# Patient Record
Sex: FEMALE | Race: WHITE | NOT HISPANIC OR LATINO | Employment: FULL TIME | URBAN - NONMETROPOLITAN AREA
[De-identification: names, ages, dates, MRNs, and addresses within clinical notes are randomized per-mention and may not be internally consistent; named-entity substitution may affect disease eponyms.]

---
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
## 2021-03-16 ENCOUNTER — TRANSCRIBE ORDERS (OUTPATIENT)
Dept: ADMINISTRATIVE | Facility: HOSPITAL | Age: 21
End: 2021-03-16

## 2021-03-16 ENCOUNTER — HOSPITAL ENCOUNTER (OUTPATIENT)
Dept: ULTRASOUND IMAGING | Facility: HOSPITAL | Age: 21
Discharge: HOME | End: 2021-03-16
Admitting: NURSE PRACTITIONER

## 2021-03-16 DIAGNOSIS — R10.11 RIGHT UPPER QUADRANT ABDOMINAL PAIN: ICD-10-CM

## 2021-03-16 DIAGNOSIS — R10.11 RIGHT UPPER QUADRANT ABDOMINAL PAIN: Primary | ICD-10-CM

## 2021-03-16 PROCEDURE — 76705 ECHO EXAM OF ABDOMEN: CPT

## 2021-09-17 ENCOUNTER — TRANSCRIBE ORDERS (OUTPATIENT)
Dept: ADMINISTRATIVE | Facility: HOSPITAL | Age: 21
End: 2021-09-17

## 2021-09-17 ENCOUNTER — HOSPITAL ENCOUNTER (OUTPATIENT)
Dept: CT IMAGING | Facility: HOSPITAL | Age: 21
Discharge: HOME | End: 2021-09-17
Admitting: NURSE PRACTITIONER

## 2021-09-17 DIAGNOSIS — R30.0 DYSURIA: ICD-10-CM

## 2021-09-17 DIAGNOSIS — R31.9 HEMATURIA SYNDROME: ICD-10-CM

## 2021-09-17 DIAGNOSIS — R31.9 HEMATURIA SYNDROME: Primary | ICD-10-CM

## 2021-09-17 PROCEDURE — 74176 CT ABD & PELVIS W/O CONTRAST: CPT

## 2021-09-20 ENCOUNTER — HOSPITAL ENCOUNTER (EMERGENCY)
Facility: HOSPITAL | Age: 21
Discharge: HOME | End: 2021-09-20
Attending: EMERGENCY MEDICINE | Admitting: EMERGENCY MEDICINE

## 2021-09-20 ENCOUNTER — APPOINTMENT (OUTPATIENT)
Dept: ULTRASOUND IMAGING | Facility: HOSPITAL | Age: 21
End: 2021-09-20

## 2021-09-20 VITALS
OXYGEN SATURATION: 99 % | DIASTOLIC BLOOD PRESSURE: 92 MMHG | WEIGHT: 285.2 LBS | HEIGHT: 70 IN | RESPIRATION RATE: 16 BRPM | TEMPERATURE: 98.7 F | BODY MASS INDEX: 40.83 KG/M2 | SYSTOLIC BLOOD PRESSURE: 145 MMHG | HEART RATE: 87 BPM

## 2021-09-20 DIAGNOSIS — D27.1 TERATOMA OF LEFT OVARY: ICD-10-CM

## 2021-09-20 DIAGNOSIS — R10.32 LEFT LOWER QUADRANT ABDOMINAL PAIN: Primary | ICD-10-CM

## 2021-09-20 LAB
ALBUMIN SERPL-MCNC: 4.1 G/DL (ref 3.5–5.2)
ALBUMIN/GLOB SERPL: 1.3 G/DL
ALP SERPL-CCNC: 67 U/L (ref 39–117)
ALT SERPL W P-5'-P-CCNC: 13 U/L (ref 1–33)
ANION GAP SERPL CALCULATED.3IONS-SCNC: 11.6 MMOL/L (ref 5–15)
AST SERPL-CCNC: 23 U/L (ref 1–32)
BACTERIA UR QL AUTO: ABNORMAL /HPF
BASOPHILS # BLD AUTO: 0.04 10*3/MM3 (ref 0–0.2)
BASOPHILS NFR BLD AUTO: 0.6 % (ref 0–1.5)
BILIRUB SERPL-MCNC: <0.2 MG/DL (ref 0–1.2)
BILIRUB UR QL STRIP: NEGATIVE
BUN SERPL-MCNC: 8 MG/DL (ref 6–20)
BUN/CREAT SERPL: 10.3 (ref 7–25)
CALCIUM SPEC-SCNC: 9.2 MG/DL (ref 8.6–10.5)
CHLORIDE SERPL-SCNC: 103 MMOL/L (ref 98–107)
CLARITY UR: CLEAR
CO2 SERPL-SCNC: 21.4 MMOL/L (ref 22–29)
COLOR UR: YELLOW
CREAT SERPL-MCNC: 0.78 MG/DL (ref 0.57–1)
DEPRECATED RDW RBC AUTO: 42.7 FL (ref 37–54)
EOSINOPHIL # BLD AUTO: 0.1 10*3/MM3 (ref 0–0.4)
EOSINOPHIL NFR BLD AUTO: 1.5 % (ref 0.3–6.2)
ERYTHROCYTE [DISTWIDTH] IN BLOOD BY AUTOMATED COUNT: 14.6 % (ref 12.3–15.4)
GFR SERPL CREATININE-BSD FRML MDRD: 94 ML/MIN/1.73
GLOBULIN UR ELPH-MCNC: 3.2 GM/DL
GLUCOSE SERPL-MCNC: 91 MG/DL (ref 65–99)
GLUCOSE UR STRIP-MCNC: NEGATIVE MG/DL
HCT VFR BLD AUTO: 38.1 % (ref 34–46.6)
HGB BLD-MCNC: 12.2 G/DL (ref 12–15.9)
HGB UR QL STRIP.AUTO: ABNORMAL
HYALINE CASTS UR QL AUTO: ABNORMAL /LPF
IMM GRANULOCYTES # BLD AUTO: 0.02 10*3/MM3 (ref 0–0.05)
IMM GRANULOCYTES NFR BLD AUTO: 0.3 % (ref 0–0.5)
KETONES UR QL STRIP: NEGATIVE
LEUKOCYTE ESTERASE UR QL STRIP.AUTO: NEGATIVE
LIPASE SERPL-CCNC: 51 U/L (ref 13–60)
LYMPHOCYTES # BLD AUTO: 3.42 10*3/MM3 (ref 0.7–3.1)
LYMPHOCYTES NFR BLD AUTO: 50.4 % (ref 19.6–45.3)
MCH RBC QN AUTO: 25.5 PG (ref 26.6–33)
MCHC RBC AUTO-ENTMCNC: 32 G/DL (ref 31.5–35.7)
MCV RBC AUTO: 79.5 FL (ref 79–97)
MONOCYTES # BLD AUTO: 0.45 10*3/MM3 (ref 0.1–0.9)
MONOCYTES NFR BLD AUTO: 6.6 % (ref 5–12)
NEUTROPHILS NFR BLD AUTO: 2.75 10*3/MM3 (ref 1.7–7)
NEUTROPHILS NFR BLD AUTO: 40.6 % (ref 42.7–76)
NITRITE UR QL STRIP: NEGATIVE
NRBC BLD AUTO-RTO: 0 /100 WBC (ref 0–0.2)
PH UR STRIP.AUTO: 6.5 [PH] (ref 5–8)
PLATELET # BLD AUTO: 325 10*3/MM3 (ref 140–450)
PMV BLD AUTO: 11.1 FL (ref 6–12)
POTASSIUM SERPL-SCNC: 4.2 MMOL/L (ref 3.5–5.2)
PROT SERPL-MCNC: 7.3 G/DL (ref 6–8.5)
PROT UR QL STRIP: ABNORMAL
RBC # BLD AUTO: 4.79 10*6/MM3 (ref 3.77–5.28)
RBC # UR: ABNORMAL /HPF
REF LAB TEST METHOD: ABNORMAL
SODIUM SERPL-SCNC: 136 MMOL/L (ref 136–145)
SP GR UR STRIP: 1.02 (ref 1–1.03)
SQUAMOUS #/AREA URNS HPF: ABNORMAL /HPF
UROBILINOGEN UR QL STRIP: ABNORMAL
WBC # BLD AUTO: 6.78 10*3/MM3 (ref 3.4–10.8)
WBC UR QL AUTO: ABNORMAL /HPF

## 2021-09-20 PROCEDURE — 83690 ASSAY OF LIPASE: CPT | Performed by: PHYSICIAN ASSISTANT

## 2021-09-20 PROCEDURE — 85025 COMPLETE CBC W/AUTO DIFF WBC: CPT | Performed by: PHYSICIAN ASSISTANT

## 2021-09-20 PROCEDURE — 81001 URINALYSIS AUTO W/SCOPE: CPT | Performed by: PHYSICIAN ASSISTANT

## 2021-09-20 PROCEDURE — 25010000002 ONDANSETRON PER 1 MG: Performed by: PHYSICIAN ASSISTANT

## 2021-09-20 PROCEDURE — 96374 THER/PROPH/DIAG INJ IV PUSH: CPT

## 2021-09-20 PROCEDURE — 76830 TRANSVAGINAL US NON-OB: CPT

## 2021-09-20 PROCEDURE — 80053 COMPREHEN METABOLIC PANEL: CPT | Performed by: PHYSICIAN ASSISTANT

## 2021-09-20 PROCEDURE — 96375 TX/PRO/DX INJ NEW DRUG ADDON: CPT

## 2021-09-20 PROCEDURE — 25010000002 KETOROLAC TROMETHAMINE PER 15 MG: Performed by: PHYSICIAN ASSISTANT

## 2021-09-20 PROCEDURE — 99283 EMERGENCY DEPT VISIT LOW MDM: CPT

## 2021-09-20 RX ORDER — KETOROLAC TROMETHAMINE 30 MG/ML
15 INJECTION, SOLUTION INTRAMUSCULAR; INTRAVENOUS ONCE
Status: COMPLETED | OUTPATIENT
Start: 2021-09-20 | End: 2021-09-20

## 2021-09-20 RX ORDER — ONDANSETRON 2 MG/ML
4 INJECTION INTRAMUSCULAR; INTRAVENOUS ONCE
Status: COMPLETED | OUTPATIENT
Start: 2021-09-20 | End: 2021-09-20

## 2021-09-20 RX ADMIN — KETOROLAC TROMETHAMINE 15 MG: 30 INJECTION, SOLUTION INTRAMUSCULAR at 22:51

## 2021-09-20 RX ADMIN — SODIUM CHLORIDE 1000 ML: 9 INJECTION, SOLUTION INTRAVENOUS at 22:49

## 2021-09-20 RX ADMIN — ONDANSETRON 4 MG: 2 INJECTION INTRAMUSCULAR; INTRAVENOUS at 22:49

## 2021-09-21 NOTE — ED PROVIDER NOTES
Subjective   History of Present Illness   Patient is a 21-year-old female with history of ovarian torsion status post right oophorectomy presenting to the ER with complaints of left lower quadrant pain that has been severe and constant since last night.  Patient states that she has been having intermittent pain for few days now and her PCP ordered a CT scan which was performed on Friday.  She states that she was told she had a golf ball sized cyst on her left ovary.  She states that she has follow-up with OB/GYN in a week but came to the ER for further evaluation due to persistent pain since last night.  She denies any new sexual partners, vaginal discharge, fever, vomiting, urinary symptoms, and additional symptoms/complaints at this time.    Review of Systems   Gastrointestinal: Positive for abdominal pain (LLQ) and nausea.   Genitourinary:        Ovarian cyst   All other systems reviewed and are negative.      Past Medical History:   Diagnosis Date   • Asthma        No Known Allergies    Past Surgical History:   Procedure Laterality Date   • MOUTH SURGERY     • RIGHT OOPHORECTOMY     • TYMPANOSTOMY TUBE PLACEMENT         Family History   Problem Relation Age of Onset   • Hypertension Maternal Grandmother    • Diabetes Maternal Grandmother    • Coronary artery disease Maternal Grandmother    • Lung cancer Maternal Grandfather    • Hypertension Maternal Grandfather    • Coronary artery disease Other    • Breast cancer Other    • Coronary artery disease Father    • No Known Problems Mother        Social History     Socioeconomic History   • Marital status: Single     Spouse name: Not on file   • Number of children: Not on file   • Years of education: Not on file   • Highest education level: Not on file   Tobacco Use   • Smoking status: Never Smoker   • Smokeless tobacco: Never Used   Substance and Sexual Activity   • Alcohol use: No   • Drug use: No   • Sexual activity: Never     Birth control/protection: None            Objective   Physical Exam  Vitals and nursing note reviewed.   Constitutional:       General: She is not in acute distress.     Appearance: She is not toxic-appearing.   HENT:      Head: Normocephalic and atraumatic.   Eyes:      Extraocular Movements: Extraocular movements intact.   Cardiovascular:      Rate and Rhythm: Tachycardia present.      Heart sounds: Normal heart sounds. No murmur heard.   No friction rub. No gallop.    Pulmonary:      Effort: Pulmonary effort is normal.      Breath sounds: Normal breath sounds.   Abdominal:      General: Abdomen is flat. Bowel sounds are normal.      Palpations: Abdomen is soft.      Tenderness: There is abdominal tenderness in the left lower quadrant. There is no guarding or rebound.   Skin:     General: Skin is warm and dry.   Neurological:      General: No focal deficit present.      Mental Status: She is alert and oriented to person, place, and time.   Psychiatric:         Mood and Affect: Mood normal.         Behavior: Behavior normal.         Procedures           ED Course  ED Course as of Sep 20 2359   Mon Sep 20, 2021   2309 WBC: 6.78 [AP]   2309 RBC: 4.79 [AP]   2309 Hemoglobin: 12.2 [AP]   2309 Hematocrit: 38.1 [AP]   2309 Platelets: 325 [AP]   2309 Color, UA: Yellow [AP]   2309 Appearance, UA: Clear [AP]   2309 pH, UA: 6.5 [AP]   2309 Specific Gravity, UA: 1.019 [AP]   2309 Glucose: Negative [AP]   2309 Ketones, UA: Negative [AP]   2309 Bilirubin, UA: Negative [AP]   2309 Blood, UA(!): Large (3+) [AP]   2309 Protein, UA(!): Trace [AP]   2309 Leukocytes, UA: Negative [AP]   2309 Nitrite, UA: Negative [AP]   2309 RBC, UA(!): 6-12 [AP]   2309 WBC, UA: None Seen [AP]   2309 Bacteria, UA(!): Trace [AP]   2309 Squamous Epithelial Cells, UA: 0-2 [AP]   2359 Narrative & Impression  FINAL REPORT     TECHNIQUE:  Transvaginal sonographic images the pelvis were obtained.     CLINICAL HISTORY:  LT OVARIAN CYST ON CT     COMPARISON:  CT  9/17/2021     FINDINGS:  There is mild thickening of the endometrium measuring 17 mm.  There is a left ovarian echogenic structure with ill-defined  posterior acoustic shadowing compatible with a mature cystic  teratoma, also known as a dermoid cyst.  There is no evidence of  torsion.  The right ovary is not visualized.     IMPRESSION:  Left ovarian mature cystic teratoma.  No evidence of torsion.  Mild thickening of the endometrium, may be secondary to  patient's menstrual cycle.     Authenticated by Facundo Guy MD on 09/20/2021 11:50:11 PM    [AP]      ED Course User Index  [AP] Jocelyn Negrete, LISA                                           [facility]   Patient evaluated in the ER for left lower quadrant pain and concern for ovarian torsion.  Patient had ovarian cyst discovered on CT performed Friday and has had persistent pain since yesterday.  Patient has history of ovarian torsion with removal of right ovary.  Transvaginal ultrasound is negative for torsion, reveals a left ovarian mature cystic teratoma.  Patient has follow-up appointment with OB/GYN next week. She was given Toradol and Zofran in the ER. She was advised to take Tylenol and ibuprofen per directions on the package as needed for pain.  Precautions were given for return to the ER for any new or worsening symptoms.    Final diagnoses:   Left lower quadrant abdominal pain   Teratoma of left ovary       ED Disposition  ED Disposition     ED Disposition Condition Comment    Discharge Stable           Belem West, APRN  [address] [address]  [phone]    Call   As needed    [facility]  [address] [facility] [address] [phone]  Go to   As needed, If symptoms worsen    OBGYN    Go to   for scheduled appointment next week for follow-up of left ovarian teratoma         Medication List      No changes were made to your prescriptions during this visit.          Jocelyn Negrete,  LISA  09/20/21 4017

## 2021-09-27 ENCOUNTER — OFFICE VISIT (OUTPATIENT)
Dept: OBSTETRICS AND GYNECOLOGY | Facility: CLINIC | Age: 21
End: 2021-09-27

## 2021-09-27 VITALS
DIASTOLIC BLOOD PRESSURE: 80 MMHG | HEIGHT: 69 IN | BODY MASS INDEX: 42.06 KG/M2 | WEIGHT: 284 LBS | SYSTOLIC BLOOD PRESSURE: 130 MMHG

## 2021-09-27 DIAGNOSIS — Z30.016 ENCOUNTER FOR INITIAL PRESCRIPTION OF TRANSDERMAL PATCH HORMONAL CONTRACEPTIVE DEVICE: ICD-10-CM

## 2021-09-27 DIAGNOSIS — R10.2 PELVIC PAIN: Primary | ICD-10-CM

## 2021-09-27 DIAGNOSIS — D27.1 DERMOID CYST OF LEFT OVARY: ICD-10-CM

## 2021-09-27 PROCEDURE — 99204 OFFICE O/P NEW MOD 45 MIN: CPT | Performed by: PHYSICIAN ASSISTANT

## 2021-09-27 RX ORDER — NORELGESTROMIN AND ETHINYL ESTRADIOL 150; 35 UG/D; UG/D
1 PATCH TRANSDERMAL WEEKLY
Qty: 3 PATCH | Refills: 12 | Status: SHIPPED | OUTPATIENT
Start: 2021-09-27 | End: 2022-09-27

## 2021-09-27 NOTE — PATIENT INSTRUCTIONS
Patient may start birth control patch with her current cycle.  She is advised I will consult MD regarding probable dermoid cyst of the left ovary to discuss possible removal of dermoid.  Given previous removal of her right ovary, want to be as conservative as possible

## 2021-09-27 NOTE — PROGRESS NOTES
Subjective   Chief Complaint   Patient presents with   • Follow-up     Left ovarian cyst, patient c/o pain in ovary       Kylah Ruiz is a 21 y.o. year old  presenting to be seen for complaint of heavy menses and pelvic pain.  Also was found to have a left ovarian cyst on recent CT scan and pelvic ultrasound.  Patient had been seen in the emergency department a few days ago for pelvic pain.  She had previously seen her PCP who ordered a CT scan.  CT scan done on 2021 noted a 4.8 cm left adnexal cyst consistent with a dermoid.  Pelvic ultrasound done 2021 noted a left sided mature teratoma though no size was given. No evidence of torsion noted.  Of note the patient had a right oophorectomy at  in 2017 for a large dermoid.  Patient reports that she has monthly menstrual cycles.  She started bleeding 2 days ago and it seems like a regular menstrual cycle.  She uses condoms for birth control but would like to start other birth control as well.  She reports she has been experiencing some left lower pelvic pain off and on for 1 month however it has been more persistent and increasing in intensity over the last 2 weeks.  She states the pain has been going between a 5-7 out of 10.        Past Medical History:   Diagnosis Date   • Asthma         Current Outpatient Medications:   •  norelgestromin-ethinyl estradiol (Xulane) 150-35 MCG/24HR, Place 1 patch on the skin as directed by provider 1 (One) Time Per Week., Disp: 3 patch, Rfl: 12   No Known Allergies   Past Surgical History:   Procedure Laterality Date   • MOUTH SURGERY     • RIGHT OOPHORECTOMY     • TYMPANOSTOMY TUBE PLACEMENT        Social History     Socioeconomic History   • Marital status: Single     Spouse name: Not on file   • Number of children: Not on file   • Years of education: Not on file   • Highest education level: Not on file   Tobacco Use   • Smoking status: Never Smoker   • Smokeless tobacco: Never Used   Vaping Use   •  "Vaping Use: Every day   • Substances: Nicotine, Flavoring   • Devices: Refillable tank   Substance and Sexual Activity   • Alcohol use: No   • Drug use: No   • Sexual activity: Never     Birth control/protection: None      Family History   Problem Relation Age of Onset   • Hypertension Maternal Grandmother    • Diabetes Maternal Grandmother    • Coronary artery disease Maternal Grandmother    • Lung cancer Maternal Grandfather    • Hypertension Maternal Grandfather    • Coronary artery disease Other    • Breast cancer Other    • Coronary artery disease Father    • No Known Problems Mother        Review of Systems   Constitutional: Negative for chills, diaphoresis and fever.   Gastrointestinal: Negative for constipation, diarrhea, nausea and vomiting.   Genitourinary: Positive for pelvic pain. Negative for difficulty urinating and dysuria.           Objective   /80   Ht 175.3 cm (69\")   Wt 129 kg (284 lb)   LMP 08/26/2021 (Exact Date)   Breastfeeding No   BMI 41.94 kg/m²     Physical Exam  Constitutional:       Appearance: Normal appearance. She is well-developed and well-groomed.   Eyes:      General: Lids are normal.      Extraocular Movements: Extraocular movements intact.      Conjunctiva/sclera: Conjunctivae normal.   Abdominal:      Palpations: Abdomen is soft.      Tenderness: There is no abdominal tenderness.   Skin:     General: Skin is warm and dry.      Findings: No bruising or lesion.   Neurological:      Mental Status: She is alert.   Psychiatric:         Attention and Perception: Attention normal.         Mood and Affect: Mood normal.         Speech: Speech normal.         Behavior: Behavior is cooperative.            Result Review :   The following data was reviewed by: Pamela Rush PA-C on 09/27/2021:    Data reviewed: Radiologic studies CT scan, pelvic ultrasound            Assessment and Plan  Diagnoses and all orders for this visit:    1. Pelvic pain (Primary)    2. Dermoid cyst of " left ovary    3. Encounter for initial prescription of transdermal patch hormonal contraceptive device    Other orders  -     norelgestromin-ethinyl estradiol (Xulane) 150-35 MCG/24HR; Place 1 patch on the skin as directed by provider 1 (One) Time Per Week.  Dispense: 3 patch; Refill: 12      Patient Instructions   Patient may start birth control patch with her current cycle.  She is advised I will consult MD regarding probable dermoid cyst of the left ovary to discuss possible removal of dermoid.  Given previous removal of her right ovary, want to be as conservative as possible              This note was electronically signed.    Pamela Rush PA-C   September 27, 2021

## 2021-09-29 ENCOUNTER — TELEPHONE (OUTPATIENT)
Dept: OBSTETRICS AND GYNECOLOGY | Facility: CLINIC | Age: 21
End: 2021-09-29

## 2021-09-30 ENCOUNTER — TELEPHONE (OUTPATIENT)
Dept: OBSTETRICS AND GYNECOLOGY | Facility: CLINIC | Age: 21
End: 2021-09-30

## 2021-09-30 NOTE — TELEPHONE ENCOUNTER
Patient is informed of Dr. Llanos's recommendation and appointment made for visit and TVS in office.    ----- Message from Savannah Llanos MD sent at 9/28/2021  5:19 PM EDT -----  Yes patient needs to be seen first and also schedule her for TVS here.  ----- Message -----  From: Pamela Rush PA-C  Sent: 9/27/2021   4:23 PM EDT  To: MD Dr. Viet Shane can you please review chart.  Patient had right ovary removed in 2017 at  for a large dermoid (records are in media).  She has now been having pain in the left ovary and recent CT scan and ultrasound revealed a 4.8 cm dermoid of the left ovary.  Would you recommend scheduling a laparoscopy with removal of dermoid cyst and hopefully retain the left ovary.  Should she be seen by physician prior to surgery?  Thank you

## 2023-01-23 ENCOUNTER — HOSPITAL ENCOUNTER (EMERGENCY)
Facility: HOSPITAL | Age: 23
Discharge: HOME | End: 2023-01-23
Attending: EMERGENCY MEDICINE | Admitting: EMERGENCY MEDICINE
Payer: MEDICAID

## 2023-01-23 ENCOUNTER — APPOINTMENT (OUTPATIENT)
Dept: ULTRASOUND IMAGING | Facility: HOSPITAL | Age: 23
End: 2023-01-23
Payer: MEDICAID

## 2023-01-23 VITALS
TEMPERATURE: 98.2 F | RESPIRATION RATE: 18 BRPM | WEIGHT: 285 LBS | BODY MASS INDEX: 42.21 KG/M2 | OXYGEN SATURATION: 100 % | HEIGHT: 69 IN | HEART RATE: 84 BPM | SYSTOLIC BLOOD PRESSURE: 142 MMHG | DIASTOLIC BLOOD PRESSURE: 84 MMHG

## 2023-01-23 DIAGNOSIS — R10.2 PELVIC PAIN: ICD-10-CM

## 2023-01-23 DIAGNOSIS — N83.202 LEFT OVARIAN CYST: Primary | ICD-10-CM

## 2023-01-23 LAB
ALBUMIN SERPL-MCNC: 4.1 G/DL (ref 3.5–5.2)
ALBUMIN/GLOB SERPL: 1.2 G/DL
ALP SERPL-CCNC: 104 U/L (ref 39–117)
ALT SERPL W P-5'-P-CCNC: 25 U/L (ref 1–33)
ANION GAP SERPL CALCULATED.3IONS-SCNC: 9.3 MMOL/L (ref 5–15)
AST SERPL-CCNC: 31 U/L (ref 1–32)
B-HCG UR QL: NEGATIVE
BACTERIA UR QL AUTO: ABNORMAL /HPF
BASOPHILS # BLD AUTO: 0.02 10*3/MM3 (ref 0–0.2)
BASOPHILS NFR BLD AUTO: 0.5 % (ref 0–1.5)
BILIRUB SERPL-MCNC: <0.2 MG/DL (ref 0–1.2)
BILIRUB UR QL STRIP: NEGATIVE
BUN SERPL-MCNC: 10 MG/DL (ref 6–20)
BUN/CREAT SERPL: 12.7 (ref 7–25)
CALCIUM SPEC-SCNC: 9.2 MG/DL (ref 8.6–10.5)
CHLORIDE SERPL-SCNC: 102 MMOL/L (ref 98–107)
CLARITY UR: ABNORMAL
CO2 SERPL-SCNC: 22.7 MMOL/L (ref 22–29)
COLOR UR: ABNORMAL
CREAT SERPL-MCNC: 0.79 MG/DL (ref 0.57–1)
DEPRECATED RDW RBC AUTO: 42.5 FL (ref 37–54)
EGFRCR SERPLBLD CKD-EPI 2021: 109.3 ML/MIN/1.73
EOSINOPHIL # BLD AUTO: 0.07 10*3/MM3 (ref 0–0.4)
EOSINOPHIL NFR BLD AUTO: 1.6 % (ref 0.3–6.2)
ERYTHROCYTE [DISTWIDTH] IN BLOOD BY AUTOMATED COUNT: 14.5 % (ref 12.3–15.4)
GLOBULIN UR ELPH-MCNC: 3.4 GM/DL
GLUCOSE SERPL-MCNC: 92 MG/DL (ref 65–99)
GLUCOSE UR STRIP-MCNC: NEGATIVE MG/DL
HCT VFR BLD AUTO: 39.6 % (ref 34–46.6)
HGB BLD-MCNC: 12.8 G/DL (ref 12–15.9)
HGB UR QL STRIP.AUTO: ABNORMAL
HYALINE CASTS UR QL AUTO: ABNORMAL /LPF
IMM GRANULOCYTES # BLD AUTO: 0.01 10*3/MM3 (ref 0–0.05)
IMM GRANULOCYTES NFR BLD AUTO: 0.2 % (ref 0–0.5)
KETONES UR QL STRIP: NEGATIVE
LEUKOCYTE ESTERASE UR QL STRIP.AUTO: NEGATIVE
LYMPHOCYTES # BLD AUTO: 2.13 10*3/MM3 (ref 0.7–3.1)
LYMPHOCYTES NFR BLD AUTO: 48.1 % (ref 19.6–45.3)
MCH RBC QN AUTO: 26 PG (ref 26.6–33)
MCHC RBC AUTO-ENTMCNC: 32.3 G/DL (ref 31.5–35.7)
MCV RBC AUTO: 80.3 FL (ref 79–97)
MONOCYTES # BLD AUTO: 0.34 10*3/MM3 (ref 0.1–0.9)
MONOCYTES NFR BLD AUTO: 7.7 % (ref 5–12)
NEUTROPHILS NFR BLD AUTO: 1.86 10*3/MM3 (ref 1.7–7)
NEUTROPHILS NFR BLD AUTO: 41.9 % (ref 42.7–76)
NITRITE UR QL STRIP: NEGATIVE
NRBC BLD AUTO-RTO: 0 /100 WBC (ref 0–0.2)
PH UR STRIP.AUTO: 6.5 [PH] (ref 5–8)
PLATELET # BLD AUTO: 343 10*3/MM3 (ref 140–450)
PMV BLD AUTO: 10.9 FL (ref 6–12)
POTASSIUM SERPL-SCNC: 4.3 MMOL/L (ref 3.5–5.2)
PROT SERPL-MCNC: 7.5 G/DL (ref 6–8.5)
PROT UR QL STRIP: ABNORMAL
RBC # BLD AUTO: 4.93 10*6/MM3 (ref 3.77–5.28)
RBC # UR STRIP: ABNORMAL /HPF
REF LAB TEST METHOD: ABNORMAL
SODIUM SERPL-SCNC: 134 MMOL/L (ref 136–145)
SP GR UR STRIP: 1.02 (ref 1–1.03)
SQUAMOUS #/AREA URNS HPF: ABNORMAL /HPF
UROBILINOGEN UR QL STRIP: ABNORMAL
WBC # UR STRIP: ABNORMAL /HPF
WBC NRBC COR # BLD: 4.43 10*3/MM3 (ref 3.4–10.8)

## 2023-01-23 PROCEDURE — 81025 URINE PREGNANCY TEST: CPT | Performed by: PHYSICIAN ASSISTANT

## 2023-01-23 PROCEDURE — 80053 COMPREHEN METABOLIC PANEL: CPT | Performed by: PHYSICIAN ASSISTANT

## 2023-01-23 PROCEDURE — 81001 URINALYSIS AUTO W/SCOPE: CPT | Performed by: PHYSICIAN ASSISTANT

## 2023-01-23 PROCEDURE — 76830 TRANSVAGINAL US NON-OB: CPT

## 2023-01-23 PROCEDURE — 99283 EMERGENCY DEPT VISIT LOW MDM: CPT

## 2023-01-23 PROCEDURE — 96374 THER/PROPH/DIAG INJ IV PUSH: CPT

## 2023-01-23 PROCEDURE — 25010000002 KETOROLAC TROMETHAMINE PER 15 MG: Performed by: PHYSICIAN ASSISTANT

## 2023-01-23 PROCEDURE — 96375 TX/PRO/DX INJ NEW DRUG ADDON: CPT

## 2023-01-23 PROCEDURE — 25010000002 ONDANSETRON PER 1 MG: Performed by: PHYSICIAN ASSISTANT

## 2023-01-23 PROCEDURE — 85025 COMPLETE CBC W/AUTO DIFF WBC: CPT | Performed by: PHYSICIAN ASSISTANT

## 2023-01-23 RX ORDER — ONDANSETRON 4 MG/1
4 TABLET, ORALLY DISINTEGRATING ORAL EVERY 6 HOURS PRN
Qty: 8 TABLET | Refills: 0 | Status: SHIPPED | OUTPATIENT
Start: 2023-01-23 | End: 2023-01-25

## 2023-01-23 RX ORDER — ONDANSETRON 2 MG/ML
4 INJECTION INTRAMUSCULAR; INTRAVENOUS ONCE
Status: COMPLETED | OUTPATIENT
Start: 2023-01-23 | End: 2023-01-23

## 2023-01-23 RX ORDER — KETOROLAC TROMETHAMINE 30 MG/ML
30 INJECTION, SOLUTION INTRAMUSCULAR; INTRAVENOUS ONCE
Status: COMPLETED | OUTPATIENT
Start: 2023-01-23 | End: 2023-01-23

## 2023-01-23 RX ORDER — SODIUM CHLORIDE 0.9 % (FLUSH) 0.9 %
10 SYRINGE (ML) INJECTION AS NEEDED
Status: DISCONTINUED | OUTPATIENT
Start: 2023-01-23 | End: 2023-01-23 | Stop reason: HOSPADM

## 2023-01-23 RX ORDER — KETOROLAC TROMETHAMINE 10 MG/1
10 TABLET, FILM COATED ORAL EVERY 6 HOURS PRN
Qty: 8 TABLET | Refills: 0 | Status: SHIPPED | OUTPATIENT
Start: 2023-01-23 | End: 2023-01-25

## 2023-01-23 RX ADMIN — KETOROLAC TROMETHAMINE 30 MG: 30 INJECTION, SOLUTION INTRAMUSCULAR; INTRAVENOUS at 18:33

## 2023-01-23 RX ADMIN — ONDANSETRON 4 MG: 2 INJECTION INTRAMUSCULAR; INTRAVENOUS at 18:35

## 2023-01-23 NOTE — ED PROVIDER NOTES
Subjective  History of Present Illness:    Chief Complaint: Left-sided pelvic pain  History of Present Illness: Patient is a 22-year-old female with history of asthma and right oophorectomy due to large ovarian cyst presenting to the ER for evaluation of left-sided pelvic pain.  She states she has been having some dull pain in her left lower abdomen/pelvis for approximately 5 days, feels very similar to when she has had ovarian cyst in the past.  She states it seemed to acutely worsen and has become more sharp and stabbing in nature since last night.  Pain does not radiate.  She states she has had some vaginal bleeding with clots today but denies any dizziness or syncope.  She denies any fever, chills, chest pain, cough, shortness of breath, change in bowel movements, dysuria, other vaginal discharge, or any other symptoms.  She denies any concern for STD, has been sexually active.  She states she follows with an OB/GYN at  who has performed surgery on her cysts in the past.  She states that she had an ultrasound scheduled for the 7th of next month but cannot get into see her OB/GYN until March.  She has not had medication for pain prior to arrival.  Onset: 5 days  Duration: 5 days with worsening pain since last night  Exacerbating / Alleviating factors: None  Associated symptoms: Vaginal bleeding      Nurses Notes reviewed and agree, including vitals, allergies, social history and prior medical history.     REVIEW OF SYSTEMS: All systems reviewed and not pertinent unless noted.  Review of Systems      Positive for: Left-sided pelvic pain, vaginal bleeding    Negative for: Fever, chills, nausea, vomiting, dysuria, change in bowel movements, vaginal discharge    Past Medical History:   Diagnosis Date   • Asthma        Allergies:    Patient has no known allergies.      Past Surgical History:   Procedure Laterality Date   • MOUTH SURGERY     • RIGHT OOPHORECTOMY     • TYMPANOSTOMY TUBE PLACEMENT           Social  "History     Socioeconomic History   • Marital status: Single   Tobacco Use   • Smoking status: Never   • Smokeless tobacco: Never   Vaping Use   • Vaping Use: Every day   • Substances: Nicotine, Flavoring   • Devices: Refillable tank   Substance and Sexual Activity   • Alcohol use: No   • Drug use: No   • Sexual activity: Never     Birth control/protection: None         Family History   Problem Relation Age of Onset   • Hypertension Maternal Grandmother    • Diabetes Maternal Grandmother    • Coronary artery disease Maternal Grandmother    • Lung cancer Maternal Grandfather    • Hypertension Maternal Grandfather    • Coronary artery disease Other    • Breast cancer Other    • Coronary artery disease Father    • No Known Problems Mother        Objective  Physical Exam:  /84   Pulse 84   Temp 98.2 °F (36.8 °C) (Oral)   Resp 18   Ht 175.3 cm (69\")   Wt 129 kg (285 lb)   LMP 01/22/2023 (Exact Date)   SpO2 100%   BMI 42.09 kg/m²      Physical Exam    CONSTITUTIONAL: Well developed, nontoxic in appearance, in no acute distress.  She does not appear septic or toxic.  She is answering questions appropriately.  VITAL SIGNS: per nursing, reviewed and noted  SKIN: exposed skin with no rashes, ulcerations or petechiae  EYES: Grossly EOMI, no icterus  ENT: Normal voice.  Patient maintained wearing a mask throughout patient encounter due to coronavirus pandemic  RESPIRATORY: Breathing even and nonlabored, no increased work of breathing. No retractions.  Lung sounds are clear to auscultation bilaterally  CARDIOVASCULAR:  regular rate and rhythm  GI: Large but nondistended.  Patient does have some tenderness in the left lower pelvic region without significant guarding  MUSCULOSKELETAL:  No tenderness. Full ROM. Strength and tone grossly normal.    NEUROLOGIC: Alert, oriented x 3. No gross deficits. GCS 15.   PSYCH: appropriate affect.      Procedures    ED Course:        ED Course as of 01/23/23 1918 Mon Jan 23, " 2023 1745 HCG, Urine QL: Negative [LA]   1745 RBC, UA(!): 31-50 [LA]   1745 WBC, UA(!): 0-2 [LA]   1745 Bacteria, UA: None Seen [LA]   1745 Squamous Epithelial Cells, UA: None Seen [LA]   1745 Hyaline Casts, UA: None Seen [LA]   1745 Methodology:: Manual Light Microscopy [LA]   1745 Color, UA(!): Red [LA]   1745 Appearance, UA(!): Cloudy [LA]   1745 pH, UA: 6.5 [LA]   1745 Specific Gravity, UA: 1.016 [LA]   1745 Glucose: Negative [LA]   1745 Ketones, UA: Negative [LA]   1745 Bilirubin, UA: Negative [LA]   1745 Blood, UA(!): Large (3+) [LA]   1745 Protein, UA(!): Trace [LA]   1745 Leukocytes, UA: Negative [LA]   1745 Nitrite, UA: Negative [LA]   1745 Urobilinogen, UA: 1.0 E.U./dL [LA]   1748 WBC: 4.43 [LA]   1748 Hemoglobin: 12.8 [LA]   1748 Platelets: 343 [LA]   1826 Glucose: 92 [LA]   1827 BUN: 10 [LA]   1827 Creatinine: 0.79 [LA]   1827 Sodium(!): 134 [LA]   1827 Potassium: 4.3 [LA]   1827 Chloride: 102 [LA]   1827 CO2: 22.7 [LA]   1827 Calcium: 9.2 [LA]   1827 Total Protein: 7.5 [LA]   1827 Albumin: 4.1 [LA]   1827 ALT (SGPT): 25 [LA]   1827 AST (SGOT): 31 [LA]   1827 Alkaline Phosphatase: 104 [LA]   1827 Total Bilirubin: <0.2 [LA]   1827 Globulin: 3.4 [LA]   1827 A/G Ratio: 1.2 [LA]   1827 BUN/Creatinine Ratio: 12.7 [LA]   1827 Anion Gap: 9.3 [LA]   1827 eGFR: 109.3 [LA]   1827 Ultrasound was read as abnormal left ovary, small dermoid cyst is not excluded.  Follow-up ultrasound recommended in 6 months, CT could be obtained for further evaluation.  There are measuring a 4 cm cyst on the left ovary with a 1.5 cm echogenic area of uncertain etiology. [LA]   1834 Discussed findings with patient.  Patient's mother is at bedside at this time as well.  She did give me permission to discuss her medical findings in front of her family.  Discussed findings of the ultrasound.  Discussed that we could obtain a CT per radiology but through shared decision making decided to not get this today.  She states the pain is very  similar to her cysts in the past and they were mainly worried about ruling out torsion and they do see appropriate blood flow on ultrasound. [LA]   1906 FINAL REPORT     TECHNIQUE:  Transvaginal sonographic images of the pelvis were obtained.     CLINICAL HISTORY:  History of severe ovarian cysts status post right oophorectomy.  Left lower pe     FINDINGS:  The uterus is normal with a normal endometrial thickness of 4  mm.  The right ovary has been removed.  There is a complex left  ovarian cyst measuring 4 cm in diameter.  Also within the left  ovary is a 1.5 cm echogenic area of uncertain etiology, a  dermoid cyst is not excluded.  The left ovary demonstrates flow  on color Doppler imaging.  There is no free fluid.     IMPRESSION:  Abnormal left ovary, small dermoid cyst is not excluded.  Follow-up ultrasound in 6 months  plus or minus CT is  recommended for further evaluation     Authenticated and Electronically Signed by Sudheer Fernando M.D. on  01/23/2023 07:03:29 PM [LA]   1911 Patient felt better after medication.  Discussed follow-up with primary care provider and OB/GYN.  We will call in a short course of Toradol and Zofran for her to use at home.  Discussed very strict return precautions.  Pelvic exam declined until follow-up with OB/GYN [LA]      ED Course User Index  [LA] Vikki Horton PA-C       Lab Results (last 24 hours)     Procedure Component Value Units Date/Time    Pregnancy, Urine - Urine, Clean Catch [[id number]]  (Normal) Collected: 01/23/23 1640    Specimen: Urine, Clean Catch Updated: 01/23/23 1649     HCG, Urine QL Negative    Urinalysis With Microscopic If Indicated (No Culture) - Urine, Clean Catch [[id number]]  (Abnormal) Collected: 01/23/23 1640    Specimen: Urine, Clean Catch Updated: 01/23/23 1656     Color, UA Red     Appearance, UA Cloudy     pH, UA 6.5     Specific Gravity, UA 1.016     Glucose, UA Negative     Ketones, UA Negative     Bilirubin, UA Negative     Blood, UA Large (3+)      Protein, UA Trace     Leuk Esterase, UA Negative     Nitrite, UA Negative     Urobilinogen, UA 1.0 E.U./dL    Urinalysis, Microscopic Only - Urine, Clean Catch [[id number]]  (Abnormal) Collected: 01/23/23 1640    Specimen: Urine, Clean Catch Updated: 01/23/23 1701     RBC, UA 31-50 /HPF      WBC, UA 0-2 /HPF      Bacteria, UA None Seen /HPF      Squamous Epithelial Cells, UA None Seen /HPF      Hyaline Casts, UA None Seen /LPF      Methodology Manual Light Microscopy    CBC & Differential [[id number]]  (Abnormal) Collected: 01/23/23 1741    Specimen: Blood Updated: 01/23/23 1747    Narrative:      The following orders were created for panel order CBC & Differential.  Procedure                               Abnormality         Status                     ---------                               -----------         ------                     CBC Auto Differential[[id number]]        Abnormal            Final result                 Please view results for these tests on the individual orders.    Comprehensive Metabolic Panel [[id number]]  (Abnormal) Collected: 01/23/23 1741    Specimen: Blood Updated: 01/23/23 1817     Glucose 92 mg/dL      BUN 10 mg/dL      Creatinine 0.79 mg/dL      Sodium 134 mmol/L      Potassium 4.3 mmol/L      Comment: Slight hemolysis detected by analyzer. Results may be affected.        Chloride 102 mmol/L      CO2 22.7 mmol/L      Calcium 9.2 mg/dL      Total Protein 7.5 g/dL      Albumin 4.1 g/dL      ALT (SGPT) 25 U/L      AST (SGOT) 31 U/L      Comment: Slight hemolysis detected by analyzer. Results may be affected.        Alkaline Phosphatase 104 U/L      Total Bilirubin <0.2 mg/dL      Globulin 3.4 gm/dL      A/G Ratio 1.2 g/dL      BUN/Creatinine Ratio 12.7     Anion Gap 9.3 mmol/L      eGFR 109.3 mL/min/1.73     Narrative:      GFR Normal >60  Chronic Kidney Disease <60  Kidney Failure <15      CBC Auto Differential [[id number]]  (Abnormal) Collected: 01/23/23 1741    Specimen: Blood Updated:  01/23/23 1747     WBC 4.43 10*3/mm3      RBC 4.93 10*6/mm3      Hemoglobin 12.8 g/dL      Hematocrit 39.6 %      MCV 80.3 fL      MCH 26.0 pg      MCHC 32.3 g/dL      RDW 14.5 %      RDW-SD 42.5 fl      MPV 10.9 fL      Platelets 343 10*3/mm3      Neutrophil % 41.9 %      Lymphocyte % 48.1 %      Monocyte % 7.7 %      Eosinophil % 1.6 %      Basophil % 0.5 %      Immature Grans % 0.2 %      Neutrophils, Absolute 1.86 10*3/mm3      Lymphocytes, Absolute 2.13 10*3/mm3      Monocytes, Absolute 0.34 10*3/mm3      Eosinophils, Absolute 0.07 10*3/mm3      Basophils, Absolute 0.02 10*3/mm3      Immature Grans, Absolute 0.01 10*3/mm3      nRBC 0.0 /100 WBC            US Non-ob Transvaginal    Result Date: 1/23/2023  FINAL REPORT TECHNIQUE: Transvaginal sonographic images of the pelvis were obtained. CLINICAL HISTORY: History of severe ovarian cysts status post right oophorectomy. Left lower pe FINDINGS: The uterus is normal with a normal endometrial thickness of 4 mm.  The right ovary has been removed.  There is a complex left ovarian cyst measuring 4 cm in diameter.  Also within the left ovary is a 1.5 cm echogenic area of uncertain etiology, a dermoid cyst is not excluded.  The left ovary demonstrates flow on color Doppler imaging.  There is no free fluid.     Impression: Abnormal left ovary, small dermoid cyst is not excluded. Follow-up ultrasound in 6 months  plus or minus CT is recommended for further evaluation Authenticated and Electronically Signed by Sudheer Fernando M.D. on 01/23/2023 07:03:29 PM         MDM    Initial impression of presenting illness: Patient is a 22-year-old female history of severe ovarian cysts status post right oophorectomy presenting to the ER for evaluation of left-sided pelvic pain.  On arrival patient is overall stable, mildly elevated blood pressure, afebrile, no acute distress    DDX: includes but is not limited to: Ovarian cyst, ovarian torsion, PID, cystitis, nephrolithiasis, and other  concerns.    Patient arrives in overall stable condition with vitals interpreted by myself.     Pertinent features from physical exam: Tenderness to the left lower pelvic area, normal vital signs.    Initial diagnostic plan: We will check basic labs, urinalysis and UPT.  Given the fact patient has history of ovarian cysts with worsening pain and only 1 single ovary at this time, will obtain ultrasound to make sure there is no ovarian torsion.    Results from initial plan were reviewed and interpreted by me revealing stable lab work, no significant anemia or leukocytosis.  UPT was negative.  Urine had blood without signs of infection.  Ultrasound was read by the radiologist as a complex 4 cm cyst with normal blood flow.  They state there was an echogenic area in the center that could represent a dermoid cyst.  They recommended repeat ultrasound, said we could obtain a CT scan if warranted.  Discussed with the patient.  Through shared decision-making she declined CT scan since it would not change acute management.  She states she was mostly concerned about the blood flow to the ovary.  She declined pelvic exam until follow-up with OB/GYN    Diagnostic information from other sources: Chart review    Interventions / Re-evaluation: Patient's pain improved after medication.  She was resting comfortably in the stretcher.    Results/clinical rationale were discussed with patient and mother at bedside with patient's permission.  Discussed all findings of the ultrasound, lab work.  We will call her in a short course of Toradol and Zofran to use for pain.  Discussed importance of follow-up with her OB/GYN and contacting their office for earliest available appointment.  Discussed very strict return precautions to the ER.  She verbalized understanding and was in agreement with this plan of care.    Consultations/Discussion of results with other physicians: Dr. Castellon    Disposition plan: Discharge  -----    Final diagnoses:    Left ovarian cyst   Pelvic pain        Vikki Horton PA-C  01/23/23 1918

## 2023-01-24 NOTE — DISCHARGE INSTRUCTIONS
Ultrasound revealed a complex cyst of your left ovary that was measuring 4 cm in diameter.  They saw normal blood flow to the ovary, no free fluid.  Your blood work was stable here today.  There is blood in your urine without signs of infection.  You can take the Toradol as needed for pain.  Do not take additional Aleve, Motrin or ibuprofen with this given it is the same type of medication and very strong.  You can alternate Tylenol 500 mg every 6 hours.  Take Zofran as needed for nausea and vomiting.  Try to call your OB/GYN at  tomorrow to update them on findings and change in your pain.  They may want to see you earlier in their clinic.  Return to the ER for any change, worsening of symptoms, or any additional concerns including but not limited to severe, worsening or persistent pain, intractable vomiting, fever greater than 100.4, heavy bleeding with dizziness or syncope.

## 2023-09-07 ENCOUNTER — HOSPITAL ENCOUNTER (EMERGENCY)
Facility: HOSPITAL | Age: 23
Discharge: HOME | End: 2023-09-07
Attending: STUDENT IN AN ORGANIZED HEALTH CARE EDUCATION/TRAINING PROGRAM
Payer: MEDICAID

## 2023-09-07 ENCOUNTER — APPOINTMENT (OUTPATIENT)
Dept: ULTRASOUND IMAGING | Facility: HOSPITAL | Age: 23
End: 2023-09-07
Payer: MEDICAID

## 2023-09-07 ENCOUNTER — APPOINTMENT (OUTPATIENT)
Dept: CT IMAGING | Facility: HOSPITAL | Age: 23
End: 2023-09-07
Payer: MEDICAID

## 2023-09-07 VITALS
WEIGHT: 293 LBS | DIASTOLIC BLOOD PRESSURE: 59 MMHG | SYSTOLIC BLOOD PRESSURE: 125 MMHG | BODY MASS INDEX: 43.4 KG/M2 | RESPIRATION RATE: 16 BRPM | TEMPERATURE: 97.8 F | HEART RATE: 89 BPM | OXYGEN SATURATION: 96 % | HEIGHT: 69 IN

## 2023-09-07 DIAGNOSIS — N83.202 CYST OF LEFT OVARY: Primary | ICD-10-CM

## 2023-09-07 LAB
ALBUMIN SERPL-MCNC: 4.3 G/DL (ref 3.5–5.2)
ALBUMIN/GLOB SERPL: 1.4 G/DL
ALP SERPL-CCNC: 83 U/L (ref 39–117)
ALT SERPL W P-5'-P-CCNC: 15 U/L (ref 1–33)
ANION GAP SERPL CALCULATED.3IONS-SCNC: 11 MMOL/L (ref 5–15)
AST SERPL-CCNC: 18 U/L (ref 1–32)
B-HCG UR QL: NEGATIVE
BACTERIA UR QL AUTO: ABNORMAL /HPF
BASOPHILS # BLD AUTO: 0.04 10*3/MM3 (ref 0–0.2)
BASOPHILS NFR BLD AUTO: 0.6 % (ref 0–1.5)
BILIRUB SERPL-MCNC: 0.2 MG/DL (ref 0–1.2)
BILIRUB UR QL STRIP: ABNORMAL
BUN SERPL-MCNC: 8 MG/DL (ref 6–20)
BUN/CREAT SERPL: 11 (ref 7–25)
CALCIUM SPEC-SCNC: 9.5 MG/DL (ref 8.6–10.5)
CHLORIDE SERPL-SCNC: 103 MMOL/L (ref 98–107)
CLARITY UR: ABNORMAL
CO2 SERPL-SCNC: 23 MMOL/L (ref 22–29)
COLOR UR: ABNORMAL
CREAT SERPL-MCNC: 0.73 MG/DL (ref 0.57–1)
DEPRECATED RDW RBC AUTO: 41.4 FL (ref 37–54)
EGFRCR SERPLBLD CKD-EPI 2021: 119.4 ML/MIN/1.73
EOSINOPHIL # BLD AUTO: 0.11 10*3/MM3 (ref 0–0.4)
EOSINOPHIL NFR BLD AUTO: 1.5 % (ref 0.3–6.2)
ERYTHROCYTE [DISTWIDTH] IN BLOOD BY AUTOMATED COUNT: 14 % (ref 12.3–15.4)
GLOBULIN UR ELPH-MCNC: 3.1 GM/DL
GLUCOSE SERPL-MCNC: 80 MG/DL (ref 65–99)
GLUCOSE UR STRIP-MCNC: NEGATIVE MG/DL
HCT VFR BLD AUTO: 41.3 % (ref 34–46.6)
HGB BLD-MCNC: 13.7 G/DL (ref 12–15.9)
HGB UR QL STRIP.AUTO: ABNORMAL
HYALINE CASTS UR QL AUTO: ABNORMAL /LPF
IMM GRANULOCYTES # BLD AUTO: 0.02 10*3/MM3 (ref 0–0.05)
IMM GRANULOCYTES NFR BLD AUTO: 0.3 % (ref 0–0.5)
KETONES UR QL STRIP: NEGATIVE
LEUKOCYTE ESTERASE UR QL STRIP.AUTO: ABNORMAL
LYMPHOCYTES # BLD AUTO: 2.93 10*3/MM3 (ref 0.7–3.1)
LYMPHOCYTES NFR BLD AUTO: 40.7 % (ref 19.6–45.3)
MCH RBC QN AUTO: 27.2 PG (ref 26.6–33)
MCHC RBC AUTO-ENTMCNC: 33.2 G/DL (ref 31.5–35.7)
MCV RBC AUTO: 81.9 FL (ref 79–97)
MONOCYTES # BLD AUTO: 0.39 10*3/MM3 (ref 0.1–0.9)
MONOCYTES NFR BLD AUTO: 5.4 % (ref 5–12)
NEUTROPHILS NFR BLD AUTO: 3.71 10*3/MM3 (ref 1.7–7)
NEUTROPHILS NFR BLD AUTO: 51.5 % (ref 42.7–76)
NITRITE UR QL STRIP: NEGATIVE
NRBC BLD AUTO-RTO: 0 /100 WBC (ref 0–0.2)
PH UR STRIP.AUTO: 5.5 [PH] (ref 5–8)
PLATELET # BLD AUTO: 313 10*3/MM3 (ref 140–450)
PMV BLD AUTO: 10.6 FL (ref 6–12)
POTASSIUM SERPL-SCNC: 3.7 MMOL/L (ref 3.5–5.2)
PROT SERPL-MCNC: 7.4 G/DL (ref 6–8.5)
PROT UR QL STRIP: ABNORMAL
RBC # BLD AUTO: 5.04 10*6/MM3 (ref 3.77–5.28)
RBC # UR STRIP: ABNORMAL /HPF
REF LAB TEST METHOD: ABNORMAL
SODIUM SERPL-SCNC: 137 MMOL/L (ref 136–145)
SP GR UR STRIP: 1.02 (ref 1–1.03)
SQUAMOUS #/AREA URNS HPF: ABNORMAL /HPF
UROBILINOGEN UR QL STRIP: ABNORMAL
WBC # UR STRIP: ABNORMAL /HPF
WBC NRBC COR # BLD: 7.2 10*3/MM3 (ref 3.4–10.8)

## 2023-09-07 PROCEDURE — 25010000002 ONDANSETRON PER 1 MG

## 2023-09-07 PROCEDURE — 87591 N.GONORRHOEAE DNA AMP PROB: CPT

## 2023-09-07 PROCEDURE — 96374 THER/PROPH/DIAG INJ IV PUSH: CPT

## 2023-09-07 PROCEDURE — 74177 CT ABD & PELVIS W/CONTRAST: CPT

## 2023-09-07 PROCEDURE — 25010000002 KETOROLAC TROMETHAMINE PER 15 MG

## 2023-09-07 PROCEDURE — 96361 HYDRATE IV INFUSION ADD-ON: CPT

## 2023-09-07 PROCEDURE — 81025 URINE PREGNANCY TEST: CPT

## 2023-09-07 PROCEDURE — 80053 COMPREHEN METABOLIC PANEL: CPT

## 2023-09-07 PROCEDURE — 87491 CHLMYD TRACH DNA AMP PROBE: CPT

## 2023-09-07 PROCEDURE — 96375 TX/PRO/DX INJ NEW DRUG ADDON: CPT

## 2023-09-07 PROCEDURE — 85025 COMPLETE CBC W/AUTO DIFF WBC: CPT

## 2023-09-07 PROCEDURE — 76830 TRANSVAGINAL US NON-OB: CPT

## 2023-09-07 PROCEDURE — 99285 EMERGENCY DEPT VISIT HI MDM: CPT

## 2023-09-07 PROCEDURE — 25510000001 IOPAMIDOL 61 % SOLUTION: Performed by: STUDENT IN AN ORGANIZED HEALTH CARE EDUCATION/TRAINING PROGRAM

## 2023-09-07 PROCEDURE — 81001 URINALYSIS AUTO W/SCOPE: CPT

## 2023-09-07 PROCEDURE — 87086 URINE CULTURE/COLONY COUNT: CPT

## 2023-09-07 RX ORDER — KETOROLAC TROMETHAMINE 30 MG/ML
30 INJECTION, SOLUTION INTRAMUSCULAR; INTRAVENOUS ONCE
Status: COMPLETED | OUTPATIENT
Start: 2023-09-07 | End: 2023-09-07

## 2023-09-07 RX ORDER — ONDANSETRON 2 MG/ML
4 INJECTION INTRAMUSCULAR; INTRAVENOUS ONCE
Status: COMPLETED | OUTPATIENT
Start: 2023-09-07 | End: 2023-09-07

## 2023-09-07 RX ORDER — DESVENLAFAXINE SUCCINATE 50 MG/1
50 TABLET, EXTENDED RELEASE ORAL DAILY
COMMUNITY

## 2023-09-07 RX ADMIN — IOPAMIDOL 100 ML: 612 INJECTION, SOLUTION INTRAVENOUS at 18:50

## 2023-09-07 RX ADMIN — KETOROLAC TROMETHAMINE 30 MG: 30 INJECTION, SOLUTION INTRAMUSCULAR; INTRAVENOUS at 15:40

## 2023-09-07 RX ADMIN — SODIUM CHLORIDE 1000 ML: 9 INJECTION, SOLUTION INTRAVENOUS at 15:40

## 2023-09-07 RX ADMIN — ONDANSETRON 4 MG: 2 INJECTION INTRAMUSCULAR; INTRAVENOUS at 15:40

## 2023-09-07 NOTE — Clinical Note
[facility] EMERGENCY DEPARTMENT  [address] [address]  Phone: [phone]    Kylah Ruiz was seen and treated in our emergency department on 9/7/2023.  She may return to work on 09/09/2023.         Thank you for choosing [facility].    Lux Clarke PA-C

## 2023-09-07 NOTE — ED PROVIDER NOTES
Subjective  History of Present Illness:    This is a 23-year-old female, history of right oophorectomy secondary to a large dermoid cyst who presents emergency room today for evaluation of left-sided ovarian pain.  Patient reports that she currently is on her period, began this 4 days ago.  Denies any chance for pregnancy.  She denies any vaginal discharge or vaginal lesions.  She reports that the left-sided pelvic pain began last night, worse this morning.  She came to the ER due to concern given her previous history of right oophorectomy.  She does report some nausea without vomiting.  No fevers.  No dysuria hematuria frequency or urgency.  No CVA tenderness or back pain.  No vaginal discharge.  Reports slightly heavier than normal vaginal bleeding but she is on her period at this time.  No known trauma.      Nurses Notes reviewed and agree, including vitals, allergies, social history and prior medical history.     REVIEW OF SYSTEMS: All systems reviewed and not pertinent unless noted.  Review of Systems   Gastrointestinal:  Negative for abdominal pain.   Genitourinary:  Positive for pelvic pain and vaginal bleeding. Negative for dysuria, frequency, hematuria, urgency, vaginal discharge and vaginal pain.   All other systems reviewed and are negative.    Past Medical History:   Diagnosis Date    Asthma        Allergies:    Patient has no known allergies.      Past Surgical History:   Procedure Laterality Date    MOUTH SURGERY      RIGHT OOPHORECTOMY      TYMPANOSTOMY TUBE PLACEMENT           Social History     Socioeconomic History    Marital status: Single   Tobacco Use    Smoking status: Never    Smokeless tobacco: Never   Vaping Use    Vaping Use: Every day    Substances: Nicotine, Flavoring    Devices: Refillable tank   Substance and Sexual Activity    Alcohol use: No    Drug use: No    Sexual activity: Never     Birth control/protection: None         Family History   Problem Relation Age of Onset     "Hypertension Maternal Grandmother     Diabetes Maternal Grandmother     Coronary artery disease Maternal Grandmother     Lung cancer Maternal Grandfather     Hypertension Maternal Grandfather     Coronary artery disease Other     Breast cancer Other     Coronary artery disease Father     No Known Problems Mother        Objective  Physical Exam:  /62   Pulse 110   Temp 97.8 °F (36.6 °C) (Oral)   Resp 14   Ht 175.3 cm (69\")   Wt 135 kg (296 lb 12.8 oz)   LMP 08/09/2023 (Approximate)   SpO2 96%   BMI 43.83 kg/m²      Physical Exam  Vitals and nursing note reviewed.   Constitutional:       General: She is not in acute distress.     Appearance: She is obese. She is not ill-appearing, toxic-appearing or diaphoretic.   HENT:      Head: Normocephalic and atraumatic.   Cardiovascular:      Rate and Rhythm: Normal rate and regular rhythm.      Heart sounds: Normal heart sounds.   Pulmonary:      Effort: Pulmonary effort is normal. No respiratory distress.      Breath sounds: Normal breath sounds. No stridor. No wheezing, rhonchi or rales.   Abdominal:      General: Abdomen is flat. Bowel sounds are normal.      Palpations: Abdomen is soft.      Tenderness: There is no right CVA tenderness, left CVA tenderness, guarding or rebound. Negative signs include Bernstein's sign.      Hernia: No hernia is present.   Skin:     General: Skin is warm and dry.      Capillary Refill: Capillary refill takes less than 2 seconds.   Neurological:      General: No focal deficit present.      Mental Status: She is alert.   Psychiatric:         Behavior: Behavior normal.           Procedures    ED Course:         Lab Results (last 24 hours)       Procedure Component Value Units Date/Time    Pregnancy, Urine - Urine, Clean Catch [[id number]]  (Normal) Collected: 09/07/23 1411    Specimen: Urine, Clean Catch Updated: 09/07/23 1428     HCG, Urine QL Negative    Urinalysis With Microscopic If Indicated (No Culture) - Urine, Clean Catch " [[id number]]  (Abnormal) Collected: 09/07/23 1411    Specimen: Urine, Clean Catch Updated: 09/07/23 1424     Color, UA Red     Appearance, UA Turbid     pH, UA 5.5     Specific Gravity, UA 1.022     Glucose, UA Negative     Ketones, UA Negative     Bilirubin, UA Small (1+)     Blood, UA Large (3+)     Protein,  mg/dL (2+)     Leuk Esterase, UA Small (1+)     Nitrite, UA Negative     Urobilinogen, UA 0.2 E.U./dL    Urinalysis, Microscopic Only - Urine, Clean Catch [[id number]]  (Abnormal) Collected: 09/07/23 1411    Specimen: Urine, Clean Catch Updated: 09/07/23 1451     RBC, UA       Unable to determine due to loaded field     /HPF     WBC, UA       Unable to determine due to loaded field     /HPF     Bacteria, UA       Unable to determine due to loaded field     /HPF     Squamous Epithelial Cells, UA       Unable to determine due to loaded field     /HPF     Hyaline Casts, UA       Unable to determine due to loaded field     /LPF     Methodology Manual Light Microscopy    Chlamydia trachomatis, Neisseria gonorrhoeae, PCR - Urine, Urine, Clean Catch [[id number]] Collected: 09/07/23 1411    Specimen: Urine, Clean Catch Updated: 09/07/23 1545    Urine Culture - Urine, Urine, Clean Catch [[id number]] Collected: 09/07/23 1411    Specimen: Urine, Clean Catch Updated: 09/07/23 1838    CBC Auto Differential [[id number]]  (Normal) Collected: 09/07/23 1540    Specimen: Blood Updated: 09/07/23 1550     WBC 7.20 10*3/mm3      RBC 5.04 10*6/mm3      Hemoglobin 13.7 g/dL      Hematocrit 41.3 %      MCV 81.9 fL      MCH 27.2 pg      MCHC 33.2 g/dL      RDW 14.0 %      RDW-SD 41.4 fl      MPV 10.6 fL      Platelets 313 10*3/mm3      Neutrophil % 51.5 %      Lymphocyte % 40.7 %      Monocyte % 5.4 %      Eosinophil % 1.5 %      Basophil % 0.6 %      Immature Grans % 0.3 %      Neutrophils, Absolute 3.71 10*3/mm3      Lymphocytes, Absolute 2.93 10*3/mm3      Monocytes, Absolute 0.39 10*3/mm3      Eosinophils, Absolute 0.11 10*3/mm3       Basophils, Absolute 0.04 10*3/mm3      Immature Grans, Absolute 0.02 10*3/mm3      nRBC 0.0 /100 WBC     Comprehensive Metabolic Panel [[id number]] Collected: 09/07/23 1540    Specimen: Blood Updated: 09/07/23 1607     Glucose 80 mg/dL      BUN 8 mg/dL      Creatinine 0.73 mg/dL      Sodium 137 mmol/L      Potassium 3.7 mmol/L      Chloride 103 mmol/L      CO2 23.0 mmol/L      Calcium 9.5 mg/dL      Total Protein 7.4 g/dL      Albumin 4.3 g/dL      ALT (SGPT) 15 U/L      AST (SGOT) 18 U/L      Alkaline Phosphatase 83 U/L      Total Bilirubin 0.2 mg/dL      Globulin 3.1 gm/dL      A/G Ratio 1.4 g/dL      BUN/Creatinine Ratio 11.0     Anion Gap 11.0 mmol/L      eGFR 119.4 mL/min/1.73     Narrative:      GFR Normal >60  Chronic Kidney Disease <60  Kidney Failure <15               US Non-ob Transvaginal    Result Date: 9/7/2023  FINAL REPORT TECHNIQUE: Transvaginal sonographic images of the pelvis were obtained. CLINICAL HISTORY: left sided ovarian pain, hx of right oopheretcomy secondary to large dermoid c FINDINGS: The uterus is normal with a normal endometrial thickness of 3 mm.  The right ovary has been removed.  There are multiple cysts within the left ovary, the largest measuring 5 cm in diameter. Within the central portion of the left ovary is a 1.6 cm x 1.0 cm hyperechoic region of uncertain etiology but this may represent a small left ovarian dermoid.  Consider CT for further evaluation.  Flow is identified on color Doppler imaging.  There is no free fluid.     Impression: Normal uterus.  Multiple left ovarian cysts.  Focus of increased echogenicity in the left ovary may be a small dermoid cyst.  CT could further evaluate. Authenticated and Electronically Signed by Sudheer Fernando M.D. on 09/07/2023 06:21:07 PM    CT Abdomen Pelvis With Contrast    Result Date: 9/7/2023  FINAL REPORT TECHNIQUE: Routine axial images through the abdomen and pelvis were obtained following IV contrast administration. CLINICAL  HISTORY: recommended by ultrasound report, concern for left ovarian dermoid cyst COMPARISON: Ultrasound earlier tonight FINDINGS: Abdomen: The gallbladder is normal.  The solid abdominal organs and ureters are unremarkable.  The GI tract is unremarkable with no sign of appendicitis.. Pelvis: The urinary bladder is unremarkable.  Uterus is unremarkable.  There are several left ovarian cysts as seen on the recent ultrasound.  No convincing area of fat within the ovary  as suggesed on the ultrasound.  The right ovary has been removed.  There is no pelvic or abdominal ascites, adenopathy or acute osseous abnormality.     Impression: Left ovarian cysts as seen on the earlier ultrasound.  No areas of fat within the ovary to suggest left ovarian dermoid cyst. Authenticated and Electronically Signed by Sudheer Fernando M.D. on 09/07/2023 08:03:06 PM        MDM      Initial impression of presenting illness: This is a 23-year-old female who presents emergency room today for evaluation of left-sided pelvic pain.  She denies any actual abdominal pain reports it is located around the left ovarian area.  No back pain.  No fevers.    DDX: includes but is not limited to: Abnormal uterine bleeding, ectopic pregnancy, pyelonephritis, urinary tract infection, STI, ovarian torsion, ruptured ovarian cyst, fibroids, ovarian cyst, other    Patient arrives hemodynamically stable, mildly tachycardic at a rate of 110, nonhypoxic and nontoxic-appearing with vitals interpreted by myself.     Pertinent features from physical exam: Abdomen is soft and nontender abdominal exam is nonreactive.  There is external left adnexal area tenderness to palpation.  Cardiac auscultation with regular rate and rhythm, lungs were clear bilaterally..    Initial diagnostic plan: CBC, CMP, urinalysis, urine pregnancy, chlamydia gonorrhea by urine PCR    Results from initial plan were reviewed and interpreted by me revealing urine hCG is negative.  Urinalysis with red  color, turbid appearance, small bilirubin, large blood, small leukocytes, unable to determine red blood cells white blood cells, bacteria or squamous epithelial cells, suspect this is likely secondary to her period and vaginal bleeding.  She denied any urinary symptoms, less concern for urinary tract infection or pyelonephritis given negative CVA tenderness bilaterally.  Ultrasound per radiology revealed multiple cyst within the left ovary largest measuring 5 cm in diameter, central portion of the left ovary is 1.6 m x 1 cm and a hypoechoic region of uncertain etiology but this may represent a small left ovarian dermoid cyst and recommended CT for further evaluation.  Flow was identified bilaterally.  Less suspicious for torsion.  CT abdomen pelvis with contrast was placed per radiology recommendations, however did discuss with the patient that this could be done on outpatient basis given that they said to consider it and it did not appear an emergent nature.  Patient elected to go ahead and have the CT abdomen pelvis done.  CBC and CMP unremarkable.  Suspect the cyst are likely the source of the patient's pain.  CT abdomen pelvis per radiology with left ovarian cyst as seen earlier on ultrasound no areas of fat within the ovary to suggest left ovarian dermoid cyst.  This is per radiology interpretation.    Diagnostic information from other sources: Chart was reviewed.    Interventions / Re-evaluation: Toradol, fluids, Zofran.  Better after interventions.    Results/clinical rationale were discussed with patient at bedside.    Consultations/Discussion of results with other physicians: N/A    Disposition plan: Discharge.  OB/GYN follow-up closely, return precautions given.  Recommend anti-inflammatories.  Stable for discharge.  -----    Final diagnoses:   Cyst of left ovary          Lux Clarke PA-C  09/07/23 2016

## 2023-09-08 LAB — BACTERIA SPEC AEROBE CULT: NO GROWTH

## 2023-09-08 NOTE — DISCHARGE INSTRUCTIONS
Return to ER for any worsening symptoms.  Recommend close follow-up with OB/GYN, if pain significantly worsens return to ER for further evaluation.

## 2023-09-11 LAB
C TRACH RRNA SPEC QL NAA+PROBE: NEGATIVE
N GONORRHOEA RRNA SPEC QL NAA+PROBE: NEGATIVE

## 2023-10-05 ENCOUNTER — LAB (OUTPATIENT)
Dept: LAB | Facility: HOSPITAL | Age: 23
End: 2023-10-05
Payer: MEDICAID

## 2023-10-05 ENCOUNTER — TRANSCRIBE ORDERS (OUTPATIENT)
Dept: LAB | Facility: HOSPITAL | Age: 23
End: 2023-10-05
Payer: MEDICAID

## 2023-10-05 DIAGNOSIS — N91.2 AMENORRHEA: Primary | ICD-10-CM

## 2023-10-05 DIAGNOSIS — N91.2 AMENORRHEA: ICD-10-CM

## 2023-10-05 LAB
HCG INTACT+B SERPL-ACNC: <0.1 MIU/ML
HCG SERPL QL: NEGATIVE

## 2023-10-05 PROCEDURE — 84703 CHORIONIC GONADOTROPIN ASSAY: CPT

## 2023-10-05 PROCEDURE — 36415 COLL VENOUS BLD VENIPUNCTURE: CPT

## 2023-10-05 PROCEDURE — 84702 CHORIONIC GONADOTROPIN TEST: CPT

## 2023-11-01 ENCOUNTER — OFFICE VISIT (OUTPATIENT)
Dept: ENDOCRINOLOGY | Facility: CLINIC | Age: 23
End: 2023-11-01
Payer: MEDICAID

## 2023-11-01 VITALS
OXYGEN SATURATION: 97 % | SYSTOLIC BLOOD PRESSURE: 126 MMHG | BODY MASS INDEX: 43.4 KG/M2 | DIASTOLIC BLOOD PRESSURE: 80 MMHG | HEART RATE: 100 BPM | WEIGHT: 293 LBS | HEIGHT: 69 IN

## 2023-11-01 DIAGNOSIS — R76.8 THYROID ANTIBODY POSITIVE: ICD-10-CM

## 2023-11-01 DIAGNOSIS — R68.89 ABNORMAL ENDOCRINE LABORATORY TEST FINDING: Primary | ICD-10-CM

## 2023-11-01 PROCEDURE — 99204 OFFICE O/P NEW MOD 45 MIN: CPT | Performed by: INTERNAL MEDICINE

## 2023-11-01 PROCEDURE — 3044F HG A1C LEVEL LT 7.0%: CPT | Performed by: INTERNAL MEDICINE

## 2023-11-01 PROCEDURE — 1159F MED LIST DOCD IN RCRD: CPT | Performed by: INTERNAL MEDICINE

## 2023-11-01 PROCEDURE — 1160F RVW MEDS BY RX/DR IN RCRD: CPT | Performed by: INTERNAL MEDICINE

## 2023-11-01 RX ORDER — DEXAMETHASONE 1 MG
1 TABLET ORAL ONCE
Qty: 1 TABLET | Refills: 0 | Status: SHIPPED | OUTPATIENT
Start: 2023-11-01 | End: 2023-11-01

## 2023-11-01 RX ORDER — PEN NEEDLE, DIABETIC 29 G X1/2"
NEEDLE, DISPOSABLE MISCELLANEOUS
COMMUNITY
Start: 2023-08-14

## 2023-11-01 RX ORDER — ERGOCALCIFEROL 1.25 MG/1
50000 CAPSULE ORAL
COMMUNITY

## 2023-11-01 RX ORDER — LAMOTRIGINE 25 MG/1
25 TABLET ORAL DAILY
COMMUNITY
Start: 2023-10-05

## 2023-11-01 RX ORDER — LIRAGLUTIDE 6 MG/ML
1.2 INJECTION SUBCUTANEOUS DAILY
COMMUNITY
Start: 2023-10-05

## 2023-11-01 NOTE — PROGRESS NOTES
Chief Complaint   Patient presents with    Abnormal Lab     DHEAS and Thyroid labs abnormal        New patient who is being seen in consultation regarding abnormal labs at the request of Belem West APRN HPI   Kylah Ruiz is a 23 y.o. female who presents for evaluation of abnormal labs including thyroid antibodies and DHEA-S.    Patient reports that she was undergoing evaluation for possible PCOS when above abnormal labs were noted prompting referral.    Patient denies any prior known history of thyroid dysfunction.  She is not currently taking any medication to alter thyroid hormone.    Patient reports she is not sure if DHEA-S has been measured previously.  She does report concerns for weight gain/inability to lose weight.  She has not had any recent steroid exposure.    Past Medical History:   Diagnosis Date    Asthma      Past Surgical History:   Procedure Laterality Date    CYST REMOVAL Left     ovary    MOUTH SURGERY      RIGHT OOPHORECTOMY      TYMPANOSTOMY TUBE PLACEMENT        Family History   Problem Relation Age of Onset    Migraines Mother     Mental illness Mother     Coronary artery disease Father     Migraines Sister     Bipolar disorder Brother     Hypertension Maternal Grandmother     Diabetes Maternal Grandmother     Coronary artery disease Maternal Grandmother     Cancer Maternal Grandfather     Lung cancer Maternal Grandfather     Hypertension Maternal Grandfather     Cancer Paternal Grandmother     Bone cancer Paternal Grandmother     Colon cancer Paternal Grandmother     Coronary artery disease Paternal Grandfather     Clotting disorder Paternal Grandfather     Coronary artery disease Other     Breast cancer Other     Bipolar disorder Half-Sister       Social History     Socioeconomic History    Marital status: Single   Tobacco Use    Smoking status: Never    Smokeless tobacco: Never   Vaping Use    Vaping Use: Every day    Substances: Nicotine, Flavoring    Devices:  "Refillable tank   Substance and Sexual Activity    Alcohol use: No    Drug use: No    Sexual activity: Never     Birth control/protection: None      No Known Allergies   Current Outpatient Medications on File Prior to Visit   Medication Sig Dispense Refill    BD ULTRA-FINE PEN NEEDLES 29G X 12.7MM misc       desvenlafaxine (PRISTIQ) 50 MG 24 hr tablet Take 1 tablet by mouth Daily.      Iron, Ferrous Sulfate, 325 (65 Fe) MG tablet Take 325 mg by mouth Daily.      lamoTRIgine (LaMICtal) 25 MG tablet Take 1 tablet by mouth Daily.      Victoza 18 MG/3ML solution pen-injector injection Inject 1.2 mg under the skin into the appropriate area as directed Daily.      vitamin D (ERGOCALCIFEROL) 1.25 MG (38780 UT) capsule capsule Take 1 capsule by mouth Every 7 (Seven) Days.      [DISCONTINUED] Liraglutide (VICTOZA SC) Inject  under the skin into the appropriate area as directed.      [DISCONTINUED] norelgestromin-ethinyl estradiol (Xulane) 150-35 MCG/24HR Place 1 patch on the skin as directed by provider 1 (One) Time Per Week. 3 patch 12     No current facility-administered medications on file prior to visit.        Review of Systems   Constitutional:  Positive for chills and fatigue.   Eyes:  Positive for photophobia and itching.   Endocrine: Positive for cold intolerance.   Genitourinary:  Positive for menstrual problem, pelvic pain and vaginal bleeding.   Psychiatric/Behavioral:  Positive for sleep disturbance and depressed mood. The patient is nervous/anxious.        Vitals:    11/01/23 0839   BP: 126/80   BP Location: Right arm   Patient Position: Sitting   Cuff Size: Adult   Pulse: 100   SpO2: 97%   Weight: (!) 137 kg (303 lb)   Height: 175.3 cm (69\")   Body mass index is 44.75 kg/m².     Physical Exam  Vitals reviewed.   Constitutional:       General: She is not in acute distress.  Neck:      Thyroid: No thyromegaly.   Cardiovascular:      Rate and Rhythm: Normal rate and regular rhythm.      Heart sounds: No murmur " heard.  Pulmonary:      Effort: Pulmonary effort is normal.      Breath sounds: Normal breath sounds.   Lymphadenopathy:      Cervical: No cervical adenopathy.   Skin:     General: Skin is warm and dry.   Neurological:      General: No focal deficit present.      Mental Status: She is alert.   Psychiatric:         Mood and Affect: Mood and affect normal.         Behavior: Behavior is cooperative.          Labs/Imaging   Latest Reference Range & Units 07/15/23 12:30   Sodium 136 - 145 mmol/L 139   Potassium 3.5 - 5.2 mmol/L 4.2   Chloride 98 - 107 mmol/L 108 (H)   CO2 22.0 - 29.0 mmol/L 19.8 (L)   Anion Gap 5.0 - 15.0 mmol/L 11.2   BUN 6 - 20 mg/dL 11   Creatinine 0.57 - 1.00 mg/dL 0.79   BUN/Creatinine Ratio 7.0 - 25.0  13.9   eGFR >60.0 mL/min/1.73 108.6   Glucose 65 - 99 mg/dL 89   Calcium 8.6 - 10.5 mg/dL 9.4   Alkaline Phosphatase 39 - 117 U/L 83   Total Protein 6.0 - 8.5 g/dL 7.1   Albumin 3.5 - 5.2 g/dL 4.2   Globulin gm/dL 2.9   A/G Ratio g/dL 1.4   AST (SGOT) 1 - 32 U/L 16   ALT (SGPT) 1 - 33 U/L 19   Total Bilirubin 0.0 - 1.2 mg/dL 0.2   DHEA-Sulfate 110.0 - 431.7 ug/dL 89.6 (L)   LH mIU/mL 3.91   FSH mIU/mL 7.77   Prolactin 4.79 - 23.30 ng/mL 13.60   Hemoglobin A1C 4.80 - 5.60 % 5.20   Estrogen pg/mL 165   Progesterone ng/mL 0.21   Testosterone, Total 8.40 - 48.10 ng/dL 14.60   Insulin 2.6 - 24.9 uIU/mL 24.6   TSH Baseline 0.270 - 4.200 uIU/mL 1.430   T4, Total 4.50 - 11.70 mcg/dL 9.16   Free T4 0.93 - 1.70 ng/dL 1.17   T3, Total 80.0 - 200.0 ng/dl 192.0   Thyroglobulin Ab 0.0 - 0.9 IU/mL 1.5 (H)   Thyroid Peroxidase Antibody 0 - 34 IU/mL 75 (H)   (H): Data is abnormally high  (L): Data is abnormally low    Assessment and Plan    Diagnoses and all orders for this visit:    1. Abnormal endocrine laboratory test finding (Primary)  -     Dexamethasone Level, Serum; Future  -     Cortisol - AM; Future  Patent with low DHEAs on prior evaluation. Discussed that this does not require intervention but I would  recommend evaluation of cortisol as abnormality with ACTH could cause low DHEAs and cortisol abnormality would require treatment. Reviewed recommendation to rule out both over and underproduction of cortisol. Patient has weight gain. No symptoms of adrenal insufficiency. Plan for dexamethasone suppression test. Once this results, will order AM ACTH/cortisol.   Will contact patient once labs result.    2. Thyroid antibody positive  Reviewed that thyroid antibody positivity is a risk factor but not a guarantee of future thyroid dysfunction. Reviewed threshold for treatment with thyroid hormone. Recent Thyroid function testing was normal. Discussed recommendation for repeat testing annually, sooner if symptomatic concerns arise. Symptoms of hypothyroidism were reviewed.    Other orders  -     dexAMETHasone (DECADRON) 1 MG tablet; Take 1 tablet by mouth 1 (One) Time for 1 dose. Take at 11 PM night before lab draw at 8 AM  Dispense: 1 tablet; Refill: 0      No follow-ups on file. The patient was instructed to contact the clinic with any interval questions or concerns.    Maryanne Croft MD     Dictated Utilizing Dragon Dictation

## 2023-11-03 ENCOUNTER — LAB (OUTPATIENT)
Dept: LAB | Facility: HOSPITAL | Age: 23
End: 2023-11-03
Payer: MEDICAID

## 2023-11-03 DIAGNOSIS — R68.89 ABNORMAL ENDOCRINE LABORATORY TEST FINDING: ICD-10-CM

## 2023-11-03 LAB — CORTIS SERPL-MCNC: 0.43 MCG/DL

## 2023-11-03 PROCEDURE — 80299 QUANTITATIVE ASSAY DRUG: CPT

## 2023-11-03 PROCEDURE — 82533 TOTAL CORTISOL: CPT

## 2023-11-03 PROCEDURE — 36415 COLL VENOUS BLD VENIPUNCTURE: CPT

## 2023-11-11 LAB — DEXAMETHASONE SERPL-MCNC: 379 NG/DL

## 2023-11-22 ENCOUNTER — APPOINTMENT (OUTPATIENT)
Dept: GENERAL RADIOLOGY | Facility: HOSPITAL | Age: 23
End: 2023-11-22
Payer: MEDICAID

## 2023-11-22 ENCOUNTER — HOSPITAL ENCOUNTER (EMERGENCY)
Facility: HOSPITAL | Age: 23
Discharge: HOME | End: 2023-11-22
Attending: STUDENT IN AN ORGANIZED HEALTH CARE EDUCATION/TRAINING PROGRAM | Admitting: EMERGENCY MEDICINE
Payer: MEDICAID

## 2023-11-22 VITALS
DIASTOLIC BLOOD PRESSURE: 91 MMHG | HEIGHT: 69 IN | BODY MASS INDEX: 43.4 KG/M2 | TEMPERATURE: 98.1 F | HEART RATE: 112 BPM | RESPIRATION RATE: 20 BRPM | WEIGHT: 293 LBS | SYSTOLIC BLOOD PRESSURE: 155 MMHG | OXYGEN SATURATION: 100 %

## 2023-11-22 DIAGNOSIS — M25.561 RIGHT KNEE PAIN, UNSPECIFIED CHRONICITY: Primary | ICD-10-CM

## 2023-11-22 PROCEDURE — 73562 X-RAY EXAM OF KNEE 3: CPT

## 2023-11-22 PROCEDURE — 99283 EMERGENCY DEPT VISIT LOW MDM: CPT

## 2023-11-22 RX ORDER — LANOLIN ALCOHOL/MO/W.PET/CERES
1 CREAM (GRAM) TOPICAL DAILY
COMMUNITY
Start: 2023-11-02

## 2023-11-23 NOTE — DISCHARGE INSTRUCTIONS
X-ray did not show obvious acute fracture but will be over read by radiologist in the morning if there are any other findings you will be contacted.  You can use brace for stabilization.  Rest, ice and elevate to help with pain and swelling.  May alternate ibuprofen and Tylenol for pain.  Follow-up with orthopedic surgery as scheduled.  You may need additional evaluation or imaging.  Return to the ER for any change, worsening of symptoms, or any additional concerns.

## 2023-11-23 NOTE — ED PROVIDER NOTES
Subjective  History of Present Illness:    Chief Complaint: Knee Pain  History of Present Illness: Patient is a 23-year-old female the history of asthma, depression and bipolar disorder presenting to the ER for evaluation of knee pain.  Patient reports over the past couple of months she has had a grinding sensation and noise in her right knee with certain movements.  She states yesterday she was at work during a football around with some kids, may have twisted in the knee.  She states the pain has seemed to increase since then.  She states it is mostly around the top of the knee, does not radiate.  She has not noticed any bruising, erythema or warmth.  She denies any fever, radiation of pain or any other symptoms.  She has never had any surgeries on that knee.  She denies any concern for pregnancy  Onset: Days to weeks  Duration: Persistent/worsening  Exacerbating / Alleviating factors: Worse with certain movements  Associated symptoms: None      Nurses Notes reviewed and agree, including vitals, allergies, social history and prior medical history.     REVIEW OF SYSTEMS: All systems reviewed and not pertinent unless noted.  Review of Systems      Positive for: Joint pain    Negative for: Joint swelling, joint warmth, ecchymosis, extremity weakness, fever, chills    Past Medical History:   Diagnosis Date    Asthma     Bipolar 1 disorder     Depression        Allergies:    Patient has no known allergies.      Past Surgical History:   Procedure Laterality Date    CYST REMOVAL Left     ovary    MOUTH SURGERY      RIGHT OOPHORECTOMY      TYMPANOSTOMY TUBE PLACEMENT           Social History     Socioeconomic History    Marital status: Single   Tobacco Use    Smoking status: Never    Smokeless tobacco: Never   Vaping Use    Vaping Use: Every day    Substances: Nicotine, Flavoring    Devices: Refillable tank   Substance and Sexual Activity    Alcohol use: No    Drug use: No    Sexual activity: Never     Birth  "control/protection: None         Family History   Problem Relation Age of Onset    Migraines Mother     Mental illness Mother     Coronary artery disease Father     Migraines Sister     Bipolar disorder Brother     Hypertension Maternal Grandmother     Diabetes Maternal Grandmother     Coronary artery disease Maternal Grandmother     Cancer Maternal Grandfather     Lung cancer Maternal Grandfather     Hypertension Maternal Grandfather     Cancer Paternal Grandmother     Bone cancer Paternal Grandmother     Colon cancer Paternal Grandmother     Coronary artery disease Paternal Grandfather     Clotting disorder Paternal Grandfather     Coronary artery disease Other     Breast cancer Other     Bipolar disorder Half-Sister        Objective  Physical Exam:  /91 (BP Location: Left arm, Patient Position: Sitting)   Pulse 112   Temp 98.1 °F (36.7 °C) (Oral)   Resp 20   Ht 175.3 cm (69\")   Wt (!) 139 kg (306 lb)   LMP 10/27/2023 (Approximate)   SpO2 100%   BMI 45.19 kg/m²      Physical Exam    CONSTITUTIONAL: Well developed, no acute distress  VITAL SIGNS: per nursing, reviewed and noted  SKIN: exposed skin with no rashes, ulcerations or petechiae  EYES: Grossly EOMI, no icterus, PERRL  ENT: Normal voice.  Nares patent, no facial asymmetry  RESPIRATORY:  No increased work of breathing. No retractions.   CARDIOVASCULAR: Tachycardic  MUSCULOSKELETAL: Right lower extremity unremarkable.  No significant erythema, warmth, ecchymosis noted.  She still can flex and extend the knee although range of motion is slightly decreased secondary to pain.  She does have some tenderness over the anterior superior aspect of the knee with palpation.  Mild pain elicited with varus maneuver, no obvious joint laxity with anterior posterior drawer test.  Distal pulse intact.  No calf tenderness  NEUROLOGIC: Alert, oriented x 3. No gross deficits. GCS 15.   PSYCH: appropriate affect.    Procedures    ED Course:        ED Course as of " 11/22/23 2035 Wed Nov 22, 2023 1921 Reviewed x-ray with Dr. Moody, do not see any obvious acute fracture [LR]   1924 Discussed findings the patient.  Will place in brace, she declined crutches.  She states she has referral to orthopedics already [LR]      ED Course User Index  [LR] Vikki Chauhan PA-C       Lab Results (last 24 hours)       ** No results found for the last 24 hours. **             No radiology results from the last 24 hrs       MDM      Initial impression of presenting illness: Patient is a 23-year-old female presenting to the ER for evaluation of joint pain    DDX: includes but is not limited to: Osteoarthritis, effusion, tendon or ligament injury, meniscus injury, fracture, and other concerns.  Do not see any obvious signs concerning for septic joint    Patient arrives in overall stable condition with vitals interpreted by myself.     Pertinent features from physical exam: No obvious abnormality noted.  Patient does have some decreased range of motion with flexion of the knee.  There are some diffuse tenderness to the anterior aspect of the knee, no erythema, warmth or ecchymosis.  Mild tenderness elicited with varus maneuver, no significant joint laxity noted with anterior posterior drawer test.  Distal pulses intact    Initial diagnostic plan: Will obtain x-ray to evaluate    Results from initial plan were reviewed and interpreted by me revealing overall stable workup.  Did not see obvious acute fracture    Diagnostic information from other sources: Chart review    Interventions / Re-evaluation: Patient remained stable throughout visit.  She was given a brace for comfort, declined crutches    Results/clinical rationale were discussed with patient.  Discussed symptomatic treatment, follow-up with orthopedic surgery as scheduled and return precautions to the ER.  Discussed that her x-ray will be over read by radiology in the morning if there are any other changes she will be contacted.  She  verbalized understanding and was in agreement with this plan of care    Consultations/Discussion of results with other physicians: Dr. Moody    Disposition plan: Discharge  -----    Final diagnoses:   Right knee pain, unspecified chronicity          Vikki Chauhan PA-C  11/22/23 2035

## 2024-01-11 ENCOUNTER — APPOINTMENT (OUTPATIENT)
Dept: ULTRASOUND IMAGING | Facility: HOSPITAL | Age: 24
End: 2024-01-11
Payer: MEDICAID

## 2024-01-11 ENCOUNTER — HOSPITAL ENCOUNTER (EMERGENCY)
Facility: HOSPITAL | Age: 24
Discharge: HOME | End: 2024-01-11
Attending: EMERGENCY MEDICINE
Payer: MEDICAID

## 2024-01-11 VITALS
TEMPERATURE: 97.8 F | OXYGEN SATURATION: 99 % | WEIGHT: 293 LBS | BODY MASS INDEX: 43.4 KG/M2 | DIASTOLIC BLOOD PRESSURE: 86 MMHG | HEIGHT: 69 IN | HEART RATE: 97 BPM | SYSTOLIC BLOOD PRESSURE: 148 MMHG | RESPIRATION RATE: 18 BRPM

## 2024-01-11 DIAGNOSIS — N83.202 LEFT OVARIAN CYST: Primary | ICD-10-CM

## 2024-01-11 LAB
ALBUMIN SERPL-MCNC: 4.1 G/DL (ref 3.5–5.2)
ALBUMIN/GLOB SERPL: 1.3 G/DL
ALP SERPL-CCNC: 87 U/L (ref 39–117)
ALT SERPL W P-5'-P-CCNC: 15 U/L (ref 1–33)
ANION GAP SERPL CALCULATED.3IONS-SCNC: 9 MMOL/L (ref 5–15)
AST SERPL-CCNC: 18 U/L (ref 1–32)
BACTERIA UR QL AUTO: ABNORMAL /HPF
BASOPHILS # BLD AUTO: 0.03 10*3/MM3 (ref 0–0.2)
BASOPHILS NFR BLD AUTO: 0.6 % (ref 0–1.5)
BILIRUB SERPL-MCNC: 0.2 MG/DL (ref 0–1.2)
BILIRUB UR QL STRIP: NEGATIVE
BUN SERPL-MCNC: 9 MG/DL (ref 6–20)
BUN/CREAT SERPL: 12.5 (ref 7–25)
CALCIUM SPEC-SCNC: 8.6 MG/DL (ref 8.6–10.5)
CHLORIDE SERPL-SCNC: 106 MMOL/L (ref 98–107)
CLARITY UR: CLEAR
CO2 SERPL-SCNC: 24 MMOL/L (ref 22–29)
COLOR UR: YELLOW
CREAT SERPL-MCNC: 0.72 MG/DL (ref 0.57–1)
DEPRECATED RDW RBC AUTO: 39.8 FL (ref 37–54)
EGFRCR SERPLBLD CKD-EPI 2021: 121.4 ML/MIN/1.73
EOSINOPHIL # BLD AUTO: 0.11 10*3/MM3 (ref 0–0.4)
EOSINOPHIL NFR BLD AUTO: 2.1 % (ref 0.3–6.2)
ERYTHROCYTE [DISTWIDTH] IN BLOOD BY AUTOMATED COUNT: 13 % (ref 12.3–15.4)
GLOBULIN UR ELPH-MCNC: 3.1 GM/DL
GLUCOSE SERPL-MCNC: 93 MG/DL (ref 65–99)
GLUCOSE UR STRIP-MCNC: NEGATIVE MG/DL
HCT VFR BLD AUTO: 40.2 % (ref 34–46.6)
HGB BLD-MCNC: 13.4 G/DL (ref 12–15.9)
HGB UR QL STRIP.AUTO: NEGATIVE
HOLD SPECIMEN: NORMAL
HOLD SPECIMEN: NORMAL
HYALINE CASTS UR QL AUTO: ABNORMAL /LPF
IMM GRANULOCYTES # BLD AUTO: 0.02 10*3/MM3 (ref 0–0.05)
IMM GRANULOCYTES NFR BLD AUTO: 0.4 % (ref 0–0.5)
KETONES UR QL STRIP: NEGATIVE
LEUKOCYTE ESTERASE UR QL STRIP.AUTO: ABNORMAL
LIPASE SERPL-CCNC: 27 U/L (ref 13–60)
LYMPHOCYTES # BLD AUTO: 2.23 10*3/MM3 (ref 0.7–3.1)
LYMPHOCYTES NFR BLD AUTO: 42.3 % (ref 19.6–45.3)
MCH RBC QN AUTO: 27.9 PG (ref 26.6–33)
MCHC RBC AUTO-ENTMCNC: 33.3 G/DL (ref 31.5–35.7)
MCV RBC AUTO: 83.6 FL (ref 79–97)
MONOCYTES # BLD AUTO: 0.49 10*3/MM3 (ref 0.1–0.9)
MONOCYTES NFR BLD AUTO: 9.3 % (ref 5–12)
NEUTROPHILS NFR BLD AUTO: 2.39 10*3/MM3 (ref 1.7–7)
NEUTROPHILS NFR BLD AUTO: 45.3 % (ref 42.7–76)
NITRITE UR QL STRIP: NEGATIVE
NRBC BLD AUTO-RTO: 0 /100 WBC (ref 0–0.2)
PH UR STRIP.AUTO: 6 [PH] (ref 5–8)
PLATELET # BLD AUTO: 306 10*3/MM3 (ref 140–450)
PMV BLD AUTO: 10.3 FL (ref 6–12)
POTASSIUM SERPL-SCNC: 3.9 MMOL/L (ref 3.5–5.2)
PROT SERPL-MCNC: 7.2 G/DL (ref 6–8.5)
PROT UR QL STRIP: NEGATIVE
RBC # BLD AUTO: 4.81 10*6/MM3 (ref 3.77–5.28)
RBC # UR STRIP: ABNORMAL /HPF
REF LAB TEST METHOD: ABNORMAL
SODIUM SERPL-SCNC: 139 MMOL/L (ref 136–145)
SP GR UR STRIP: 1.02 (ref 1–1.03)
SQUAMOUS #/AREA URNS HPF: ABNORMAL /HPF
UROBILINOGEN UR QL STRIP: ABNORMAL
WBC # UR STRIP: ABNORMAL /HPF
WBC NRBC COR # BLD AUTO: 5.27 10*3/MM3 (ref 3.4–10.8)
WHOLE BLOOD HOLD COAG: NORMAL
WHOLE BLOOD HOLD SPECIMEN: NORMAL

## 2024-01-11 PROCEDURE — 96374 THER/PROPH/DIAG INJ IV PUSH: CPT

## 2024-01-11 PROCEDURE — 25010000002 ONDANSETRON PER 1 MG: Performed by: EMERGENCY MEDICINE

## 2024-01-11 PROCEDURE — 25810000003 SODIUM CHLORIDE 0.9 % SOLUTION: Performed by: EMERGENCY MEDICINE

## 2024-01-11 PROCEDURE — 85025 COMPLETE CBC W/AUTO DIFF WBC: CPT | Performed by: EMERGENCY MEDICINE

## 2024-01-11 PROCEDURE — 80053 COMPREHEN METABOLIC PANEL: CPT | Performed by: EMERGENCY MEDICINE

## 2024-01-11 PROCEDURE — 76830 TRANSVAGINAL US NON-OB: CPT

## 2024-01-11 PROCEDURE — 25010000002 KETOROLAC TROMETHAMINE PER 15 MG: Performed by: EMERGENCY MEDICINE

## 2024-01-11 PROCEDURE — 83690 ASSAY OF LIPASE: CPT | Performed by: EMERGENCY MEDICINE

## 2024-01-11 PROCEDURE — 99284 EMERGENCY DEPT VISIT MOD MDM: CPT

## 2024-01-11 PROCEDURE — 81001 URINALYSIS AUTO W/SCOPE: CPT | Performed by: EMERGENCY MEDICINE

## 2024-01-11 PROCEDURE — 96375 TX/PRO/DX INJ NEW DRUG ADDON: CPT

## 2024-01-11 RX ORDER — HYDROCODONE BITARTRATE AND ACETAMINOPHEN 5; 325 MG/1; MG/1
1 TABLET ORAL ONCE
Status: COMPLETED | OUTPATIENT
Start: 2024-01-11 | End: 2024-01-11

## 2024-01-11 RX ORDER — KETOROLAC TROMETHAMINE 10 MG/1
10 TABLET, FILM COATED ORAL EVERY 6 HOURS PRN
Qty: 20 TABLET | Refills: 0 | Status: SHIPPED | OUTPATIENT
Start: 2024-01-11

## 2024-01-11 RX ORDER — ONDANSETRON 2 MG/ML
4 INJECTION INTRAMUSCULAR; INTRAVENOUS ONCE
Status: COMPLETED | OUTPATIENT
Start: 2024-01-11 | End: 2024-01-11

## 2024-01-11 RX ORDER — ONDANSETRON 4 MG/1
4 TABLET, ORALLY DISINTEGRATING ORAL EVERY 6 HOURS PRN
Qty: 10 TABLET | Refills: 0 | Status: SHIPPED | OUTPATIENT
Start: 2024-01-11

## 2024-01-11 RX ORDER — HYDROCODONE BITARTRATE AND ACETAMINOPHEN 5; 325 MG/1; MG/1
1 TABLET ORAL EVERY 6 HOURS PRN
Qty: 10 TABLET | Refills: 0 | Status: SHIPPED | OUTPATIENT
Start: 2024-01-11

## 2024-01-11 RX ORDER — KETOROLAC TROMETHAMINE 30 MG/ML
15 INJECTION, SOLUTION INTRAMUSCULAR; INTRAVENOUS ONCE
Status: COMPLETED | OUTPATIENT
Start: 2024-01-11 | End: 2024-01-11

## 2024-01-11 RX ORDER — SODIUM CHLORIDE 0.9 % (FLUSH) 0.9 %
10 SYRINGE (ML) INJECTION AS NEEDED
Status: DISCONTINUED | OUTPATIENT
Start: 2024-01-11 | End: 2024-01-11 | Stop reason: HOSPADM

## 2024-01-11 RX ADMIN — ONDANSETRON 4 MG: 2 INJECTION INTRAMUSCULAR; INTRAVENOUS at 07:50

## 2024-01-11 RX ADMIN — KETOROLAC TROMETHAMINE 15 MG: 30 INJECTION, SOLUTION INTRAMUSCULAR; INTRAVENOUS at 07:50

## 2024-01-11 RX ADMIN — HYDROCODONE BITARTRATE AND ACETAMINOPHEN 1 TABLET: 5; 325 TABLET ORAL at 07:49

## 2024-01-11 RX ADMIN — SODIUM CHLORIDE 1000 ML: 9 INJECTION, SOLUTION INTRAVENOUS at 07:49

## 2024-01-11 NOTE — Clinical Note
[facility] EMERGENCY DEPARTMENT  [address] [address]  Phone: [phone]    Kylah Ruiz was seen and treated in our emergency department on 1/11/2024.  She may return to work on 01/15/2024.         Thank you for choosing [facility].    Jovany Moody MD

## 2024-01-11 NOTE — ED PROVIDER NOTES
"  HPI: Kylah Ruiz is a 23 y.o. female who presents to the emergency department complaining of \"I think I have an ovarian cyst\".  Patient states over the last days she has had sharp moderate to severe left pelvic pain.  There are no particular alleviating or aggravating factors.  It is associated with nausea.  She denies any fevers, urinary complaints.  She notes previous history of ovarian cyst, had right oophorectomy.      REVIEW OF SYSTEMS: All other systems reviewed and are negative     PAST MEDICAL HISTORY:   Past Medical History:   Diagnosis Date    Asthma     Bipolar 1 disorder     Depression         FAMILY HISTORY:   Family History   Problem Relation Age of Onset    Migraines Mother     Mental illness Mother     Coronary artery disease Father     Migraines Sister     Bipolar disorder Brother     Hypertension Maternal Grandmother     Diabetes Maternal Grandmother     Coronary artery disease Maternal Grandmother     Cancer Maternal Grandfather     Lung cancer Maternal Grandfather     Hypertension Maternal Grandfather     Cancer Paternal Grandmother     Bone cancer Paternal Grandmother     Colon cancer Paternal Grandmother     Coronary artery disease Paternal Grandfather     Clotting disorder Paternal Grandfather     Coronary artery disease Other     Breast cancer Other     Bipolar disorder Half-Sister         SOCIAL HISTORY:   Social History     Socioeconomic History    Marital status: Single   Tobacco Use    Smoking status: Never    Smokeless tobacco: Never   Vaping Use    Vaping Use: Every day    Substances: Nicotine, Flavoring    Devices: Refillable tank   Substance and Sexual Activity    Alcohol use: No    Drug use: No    Sexual activity: Never     Birth control/protection: None        SURGICAL HISTORY:   Past Surgical History:   Procedure Laterality Date    CYST REMOVAL Left     ovary    MOUTH SURGERY      RIGHT OOPHORECTOMY      TYMPANOSTOMY TUBE PLACEMENT          ALLERGIES: Patient has no " known allergies.       PHYSICAL EXAM:   VITAL SIGNS:   Vitals:    01/11/24 0704   BP: 154/92   Pulse: 106   Resp: 18   Temp: 97.7 °F (36.5 °C)   SpO2: 98%      CONSTITUTIONAL: Awake, well appearing, nontoxic   HENT: Atraumatic, normocephalic, oral mucosa moist, airway patent. Nares patent without drainage. External ears normal.   EYES: Conjunctivae clear, EOMI, PERRL   NECK: Trachea midline, nontender, supple   CARDIOVASCULAR: Normal heart rate, Normal rhythm.  PULMONARY/CHEST: Normal work of breathing. Clear to auscultation, no rhonchi, wheezes, or rales.  ABDOMINAL: Nondistended, soft, left lower quadrant tenderness, no rebound or guarding.  NEUROLOGIC: Nonfocal, moves all four extremities, no gross sensory or motor deficits.   EXTREMITIES: No clubbing, cyanosis, or edema   SKIN: Warm, Dry, No erythema, No rash       ED COURSE / MEDICAL DECISION MAKING:     Kylah Ruiz is a 23 y.o. female who presents to the emergency department for evaluation of abdominal pain.  She is obese, well-appearing, nondistressed.  Her vital signs are normal.  Her exam is notable for left lower quadrant tenderness.  Will obtain labs, urinalysis, ultrasound.  Will treat symptomatically.  Disposition pending.    Differential diagnosis includes ovarian cyst, torsion, UTI, stone among other etiologies.    Labs are non actionable. Ultrasound with chronic findings, left ovarian cysts. She feels well, resting comfortably. Will discharge home with outpatient follow up. She is agreeable with plan of care.     Final diagnoses:   Left ovarian cyst        Jovany Moody MD  01/11/24 0915

## 2024-02-07 ENCOUNTER — HOSPITAL ENCOUNTER (EMERGENCY)
Facility: HOSPITAL | Age: 24
Discharge: HOME | End: 2024-02-07
Attending: EMERGENCY MEDICINE | Admitting: EMERGENCY MEDICINE
Payer: MEDICAID

## 2024-02-07 VITALS
OXYGEN SATURATION: 96 % | HEIGHT: 69 IN | WEIGHT: 280 LBS | BODY MASS INDEX: 41.47 KG/M2 | RESPIRATION RATE: 20 BRPM | TEMPERATURE: 98 F | SYSTOLIC BLOOD PRESSURE: 124 MMHG | HEART RATE: 93 BPM | DIASTOLIC BLOOD PRESSURE: 67 MMHG

## 2024-02-07 DIAGNOSIS — R21 RASH: Primary | ICD-10-CM

## 2024-02-07 PROCEDURE — 99283 EMERGENCY DEPT VISIT LOW MDM: CPT

## 2024-02-07 RX ORDER — FLUCONAZOLE 150 MG/1
150 TABLET ORAL ONCE
Status: COMPLETED | OUTPATIENT
Start: 2024-02-07 | End: 2024-02-07

## 2024-02-07 RX ORDER — CLOTRIMAZOLE AND BETAMETHASONE DIPROPIONATE 10; .64 MG/G; MG/G
1 CREAM TOPICAL 2 TIMES DAILY
Qty: 45 G | Refills: 0 | Status: SHIPPED | OUTPATIENT
Start: 2024-02-07

## 2024-02-07 RX ADMIN — FLUCONAZOLE 150 MG: 150 TABLET ORAL at 01:24

## 2024-02-07 NOTE — ED PROVIDER NOTES
Subjective:  History of Present Illness:    Patient is a 24-year-old female without contributing health history.  She presents to the ER today with rash to right underarm and between bilateral thigh.  Patient reports that rash is pruritic.  She denies fever.  Denies any type of discharge.  Denies OTC medication home remedy.  Denies alleviating exacerbating factors.    Nurses Notes reviewed and agree, including vitals, allergies, social history and prior medical history.     REVIEW OF SYSTEMS: All systems reviewed and not pertinent unless noted.  Review of Systems   Skin:  Positive for rash.   All other systems reviewed and are negative.      Past Medical History:   Diagnosis Date    Asthma     Bipolar 1 disorder     Depression        Allergies:    Patient has no known allergies.      Past Surgical History:   Procedure Laterality Date    CYST REMOVAL Left     ovary    MOUTH SURGERY      RIGHT OOPHORECTOMY      TYMPANOSTOMY TUBE PLACEMENT           Social History     Socioeconomic History    Marital status: Single   Tobacco Use    Smoking status: Never    Smokeless tobacco: Never   Vaping Use    Vaping Use: Every day    Substances: Nicotine, Flavoring    Devices: Refillable tank   Substance and Sexual Activity    Alcohol use: No    Drug use: No    Sexual activity: Never     Birth control/protection: None         Family History   Problem Relation Age of Onset    Migraines Mother     Mental illness Mother     Coronary artery disease Father     Migraines Sister     Bipolar disorder Brother     Hypertension Maternal Grandmother     Diabetes Maternal Grandmother     Coronary artery disease Maternal Grandmother     Cancer Maternal Grandfather     Lung cancer Maternal Grandfather     Hypertension Maternal Grandfather     Cancer Paternal Grandmother     Bone cancer Paternal Grandmother     Colon cancer Paternal Grandmother     Coronary artery disease Paternal Grandfather     Clotting disorder Paternal Grandfather     Coronary  "artery disease Other     Breast cancer Other     Bipolar disorder Half-Sister        Objective  Physical Exam:  /83 (BP Location: Left arm, Patient Position: Sitting)   Pulse 93   Temp 97.6 °F (36.4 °C) (Oral)   Resp 18   Ht 175.3 cm (69\")   Wt 127 kg (280 lb)   LMP 01/26/2024 (Approximate)   SpO2 100%   BMI 41.35 kg/m²      Physical Exam  Vitals and nursing note reviewed.   Constitutional:       Appearance: Normal appearance. She is normal weight.   HENT:      Head: Normocephalic and atraumatic.      Nose: Nose normal.      Mouth/Throat:      Mouth: Mucous membranes are moist. Mucous membranes are dry.      Pharynx: Oropharynx is clear.   Eyes:      Extraocular Movements: Extraocular movements intact.      Conjunctiva/sclera: Conjunctivae normal.      Pupils: Pupils are equal, round, and reactive to light.   Cardiovascular:      Rate and Rhythm: Normal rate and regular rhythm.   Pulmonary:      Effort: Pulmonary effort is normal.      Breath sounds: Normal breath sounds.   Abdominal:      General: Abdomen is flat. Bowel sounds are normal.      Palpations: Abdomen is soft.   Musculoskeletal:         General: Normal range of motion.      Cervical back: Normal range of motion and neck supple.   Skin:     General: Skin is warm and dry.      Capillary Refill: Capillary refill takes less than 2 seconds.      Comments: Rash noted to right underarm and between bilateral thigh.  Consistent with fungal infection.   Neurological:      General: No focal deficit present.      Mental Status: She is alert and oriented to person, place, and time. Mental status is at baseline.   Psychiatric:         Mood and Affect: Mood normal.         Behavior: Behavior normal.         Thought Content: Thought content normal.         Judgment: Judgment normal.         Procedures    ED Course:         Lab Results (last 24 hours)       ** No results found for the last 24 hours. **             No radiology results from the last 24 hrs "       MDM      Initial impression of presenting illness: Patient is a 24-year-old female without contributing health history.  She presents to the ER today with rash to right underarm and between bilateral thigh.  Patient reports that rash is pruritic.  She denies fever.  Denies any type of discharge.  Denies OTC medication home remedy.  Denies alleviating exacerbating factors.    DDX: includes but is not limited to: Fungal infection, friction rub, colitis or other    Patient arrives stable with vitals interpreted by myself.     Pertinent features from physical exam: There is a rash noted to right underarm and between bilateral thigh.  There is no discharge.  Rashes consistent with fungal infection.    Initial diagnostic plan: N/A    Results from initial plan were reviewed and interpreted by me revealing N/A    Diagnostic information from other sources: Chart review    Interventions / Re-evaluation: Vital signs stable throughout encounter.  Patient received Diflucan 150 mg p.o.    Results/clinical rationale were discussed with patient    Consultations/Discussion of results with other physicians: N/A    Disposition plan: Patient is hemodynamically stable nontoxic-appearing appropriate for discharge.  Will send patient home with antifungal cream.  Patient to follow-up with PCP in 1 week.  Follow-up with ER for new or worse symptoms.  -----        Final diagnoses:   Venkat Wilkins, APRN  02/07/24 0110

## 2024-03-13 ENCOUNTER — HOSPITAL ENCOUNTER (EMERGENCY)
Facility: HOSPITAL | Age: 24
Discharge: HOME | End: 2024-03-14
Attending: EMERGENCY MEDICINE
Payer: MEDICAID

## 2024-03-13 ENCOUNTER — APPOINTMENT (OUTPATIENT)
Dept: ULTRASOUND IMAGING | Facility: HOSPITAL | Age: 24
End: 2024-03-13
Payer: MEDICAID

## 2024-03-13 DIAGNOSIS — R10.2 PELVIC PAIN: Primary | ICD-10-CM

## 2024-03-13 LAB
ALBUMIN SERPL-MCNC: 4.1 G/DL (ref 3.5–5.2)
ALBUMIN/GLOB SERPL: 1.4 G/DL
ALP SERPL-CCNC: 89 U/L (ref 39–117)
ALT SERPL W P-5'-P-CCNC: 21 U/L (ref 1–33)
ANION GAP SERPL CALCULATED.3IONS-SCNC: 8.9 MMOL/L (ref 5–15)
AST SERPL-CCNC: 25 U/L (ref 1–32)
B-HCG UR QL: NEGATIVE
BACTERIA UR QL AUTO: ABNORMAL /HPF
BASOPHILS # BLD AUTO: 0.05 10*3/MM3 (ref 0–0.2)
BASOPHILS NFR BLD AUTO: 0.7 % (ref 0–1.5)
BILIRUB SERPL-MCNC: <0.2 MG/DL (ref 0–1.2)
BILIRUB UR QL STRIP: NEGATIVE
BUN SERPL-MCNC: 10 MG/DL (ref 6–20)
BUN/CREAT SERPL: 12.5 (ref 7–25)
CALCIUM SPEC-SCNC: 9 MG/DL (ref 8.6–10.5)
CHLORIDE SERPL-SCNC: 105 MMOL/L (ref 98–107)
CLARITY UR: CLEAR
CO2 SERPL-SCNC: 23.1 MMOL/L (ref 22–29)
COLOR UR: YELLOW
CREAT SERPL-MCNC: 0.8 MG/DL (ref 0.57–1)
DEPRECATED RDW RBC AUTO: 39.5 FL (ref 37–54)
EGFRCR SERPLBLD CKD-EPI 2021: 106.3 ML/MIN/1.73
EOSINOPHIL # BLD AUTO: 0.17 10*3/MM3 (ref 0–0.4)
EOSINOPHIL NFR BLD AUTO: 2.2 % (ref 0.3–6.2)
ERYTHROCYTE [DISTWIDTH] IN BLOOD BY AUTOMATED COUNT: 13.2 % (ref 12.3–15.4)
GLOBULIN UR ELPH-MCNC: 3 GM/DL
GLUCOSE SERPL-MCNC: 101 MG/DL (ref 65–99)
GLUCOSE UR STRIP-MCNC: NEGATIVE MG/DL
HCT VFR BLD AUTO: 38.6 % (ref 34–46.6)
HGB BLD-MCNC: 13.1 G/DL (ref 12–15.9)
HGB UR QL STRIP.AUTO: ABNORMAL
HYALINE CASTS UR QL AUTO: ABNORMAL /LPF
IMM GRANULOCYTES # BLD AUTO: 0.04 10*3/MM3 (ref 0–0.05)
IMM GRANULOCYTES NFR BLD AUTO: 0.5 % (ref 0–0.5)
KETONES UR QL STRIP: NEGATIVE
LEUKOCYTE ESTERASE UR QL STRIP.AUTO: ABNORMAL
LIPASE SERPL-CCNC: 37 U/L (ref 13–60)
LYMPHOCYTES # BLD AUTO: 3.75 10*3/MM3 (ref 0.7–3.1)
LYMPHOCYTES NFR BLD AUTO: 49.5 % (ref 19.6–45.3)
MCH RBC QN AUTO: 27.8 PG (ref 26.6–33)
MCHC RBC AUTO-ENTMCNC: 33.9 G/DL (ref 31.5–35.7)
MCV RBC AUTO: 82 FL (ref 79–97)
MONOCYTES # BLD AUTO: 0.56 10*3/MM3 (ref 0.1–0.9)
MONOCYTES NFR BLD AUTO: 7.4 % (ref 5–12)
NEUTROPHILS NFR BLD AUTO: 3.01 10*3/MM3 (ref 1.7–7)
NEUTROPHILS NFR BLD AUTO: 39.7 % (ref 42.7–76)
NITRITE UR QL STRIP: NEGATIVE
NRBC BLD AUTO-RTO: 0 /100 WBC (ref 0–0.2)
PH UR STRIP.AUTO: 7 [PH] (ref 5–8)
PLATELET # BLD AUTO: 350 10*3/MM3 (ref 140–450)
PMV BLD AUTO: 10.6 FL (ref 6–12)
POTASSIUM SERPL-SCNC: 3.7 MMOL/L (ref 3.5–5.2)
PROT SERPL-MCNC: 7.1 G/DL (ref 6–8.5)
PROT UR QL STRIP: ABNORMAL
RBC # BLD AUTO: 4.71 10*6/MM3 (ref 3.77–5.28)
RBC # UR STRIP: ABNORMAL /HPF
REF LAB TEST METHOD: ABNORMAL
SODIUM SERPL-SCNC: 137 MMOL/L (ref 136–145)
SP GR UR STRIP: 1.02 (ref 1–1.03)
SQUAMOUS #/AREA URNS HPF: ABNORMAL /HPF
UROBILINOGEN UR QL STRIP: ABNORMAL
WBC # UR STRIP: ABNORMAL /HPF
WBC NRBC COR # BLD AUTO: 7.58 10*3/MM3 (ref 3.4–10.8)

## 2024-03-13 PROCEDURE — 80053 COMPREHEN METABOLIC PANEL: CPT | Performed by: EMERGENCY MEDICINE

## 2024-03-13 PROCEDURE — 99284 EMERGENCY DEPT VISIT MOD MDM: CPT

## 2024-03-13 PROCEDURE — 85025 COMPLETE CBC W/AUTO DIFF WBC: CPT | Performed by: EMERGENCY MEDICINE

## 2024-03-13 PROCEDURE — 36415 COLL VENOUS BLD VENIPUNCTURE: CPT

## 2024-03-13 PROCEDURE — 76830 TRANSVAGINAL US NON-OB: CPT

## 2024-03-13 PROCEDURE — 83690 ASSAY OF LIPASE: CPT | Performed by: EMERGENCY MEDICINE

## 2024-03-13 PROCEDURE — 81001 URINALYSIS AUTO W/SCOPE: CPT | Performed by: EMERGENCY MEDICINE

## 2024-03-13 PROCEDURE — 81025 URINE PREGNANCY TEST: CPT | Performed by: EMERGENCY MEDICINE

## 2024-03-13 NOTE — Clinical Note
[facility] EMERGENCY DEPARTMENT  [address] [address]  Phone: [phone]    Kylah Ruiz was seen and treated in our emergency department on 3/13/2024.  She may return to work on 03/14/2024.         Thank you for choosing [facility].    Jefry Cervantes MD

## 2024-03-14 VITALS
DIASTOLIC BLOOD PRESSURE: 82 MMHG | HEIGHT: 69 IN | SYSTOLIC BLOOD PRESSURE: 144 MMHG | WEIGHT: 290 LBS | RESPIRATION RATE: 18 BRPM | TEMPERATURE: 98.1 F | BODY MASS INDEX: 42.95 KG/M2 | OXYGEN SATURATION: 98 % | HEART RATE: 107 BPM

## 2024-03-14 NOTE — ED PROVIDER NOTES
Subjective  History of Present Illness:    Chief Complaint:   Chief Complaint   Patient presents with    Pelvic Pain      History of Present Illness: Kylah Ruiz is a 24 y.o. female who presents to the emergency department complaining of left-sided pelvic pain.  History of ovarian cysts.  Also history of right oophorectomy at 17 years old due to torsion.  Patient states since 1700 hrs. has had left-sided pelvic pain.  Had an ultrasound 3 weeks ago showing 5 cm left ovarian cyst.  No urinary symptoms.  No nausea vomiting or diarrhea.  Onset: 1700 hrs.  Duration: Ongoing  Exacerbating / Alleviating factors: None  Associated symptoms: None      Nurses Notes reviewed and agree, including vitals, allergies, social history and prior medical history.     Review of Systems   Constitutional: Negative.    HENT: Negative.     Eyes: Negative.    Respiratory: Negative.     Cardiovascular: Negative.    Gastrointestinal: Negative.    Genitourinary:  Positive for pelvic pain.   Musculoskeletal: Negative.    Skin: Negative.    Neurological: Negative.    Psychiatric/Behavioral: Negative.         Past Medical History:   Diagnosis Date    Asthma     Bipolar 1 disorder     Depression        Allergies:    Patient has no known allergies.      Past Surgical History:   Procedure Laterality Date    CYST REMOVAL Left     ovary    MOUTH SURGERY      RIGHT OOPHORECTOMY      TYMPANOSTOMY TUBE PLACEMENT           Social History     Socioeconomic History    Marital status: Single   Tobacco Use    Smoking status: Never    Smokeless tobacco: Never   Vaping Use    Vaping status: Every Day    Substances: Nicotine, Flavoring    Devices: Refillable tank   Substance and Sexual Activity    Alcohol use: No    Drug use: No    Sexual activity: Never     Birth control/protection: None         Family History   Problem Relation Age of Onset    Migraines Mother     Mental illness Mother     Coronary artery disease Father     Migraines Sister     Bipolar  "disorder Brother     Hypertension Maternal Grandmother     Diabetes Maternal Grandmother     Coronary artery disease Maternal Grandmother     Cancer Maternal Grandfather     Lung cancer Maternal Grandfather     Hypertension Maternal Grandfather     Cancer Paternal Grandmother     Bone cancer Paternal Grandmother     Colon cancer Paternal Grandmother     Coronary artery disease Paternal Grandfather     Clotting disorder Paternal Grandfather     Coronary artery disease Other     Breast cancer Other     Bipolar disorder Half-Sister        Objective  Physical Exam:  /89 (BP Location: Left arm, Patient Position: Sitting)   Pulse 107   Temp 98.1 °F (36.7 °C) (Oral)   Resp 18   Ht 175.3 cm (69\")   Wt 132 kg (290 lb)   LMP 03/13/2024 (Exact Date)   SpO2 98%   BMI 42.83 kg/m²      Physical Exam  Vitals reviewed.   Constitutional:       General: She is not in acute distress.     Appearance: Normal appearance. She is obese. She is not ill-appearing, toxic-appearing or diaphoretic.   HENT:      Head: Normocephalic and atraumatic.      Nose: Nose normal.   Eyes:      Extraocular Movements: Extraocular movements intact.   Cardiovascular:      Rate and Rhythm: Normal rate.   Pulmonary:      Effort: Pulmonary effort is normal.   Abdominal:      General: Abdomen is flat.      Palpations: Abdomen is soft.      Comments: Mild left lower abdominal/pelvic tenderness.  No rebound or guarding.   Musculoskeletal:         General: Normal range of motion.      Cervical back: Normal range of motion.   Skin:     General: Skin is warm and dry.   Neurological:      General: No focal deficit present.      Mental Status: She is alert. Mental status is at baseline.   Psychiatric:         Mood and Affect: Mood normal.         Behavior: Behavior normal.           Procedures    ED Course:         Lab Results (last 24 hours)       Procedure Component Value Units Date/Time    Comprehensive Metabolic Panel [[id number]]  (Abnormal) Collected: " 03/13/24 2216    Specimen: Blood Updated: 03/13/24 2239     Glucose 101 mg/dL      BUN 10 mg/dL      Creatinine 0.80 mg/dL      Sodium 137 mmol/L      Potassium 3.7 mmol/L      Chloride 105 mmol/L      CO2 23.1 mmol/L      Calcium 9.0 mg/dL      Total Protein 7.1 g/dL      Albumin 4.1 g/dL      ALT (SGPT) 21 U/L      AST (SGOT) 25 U/L      Alkaline Phosphatase 89 U/L      Total Bilirubin <0.2 mg/dL      Globulin 3.0 gm/dL      A/G Ratio 1.4 g/dL      BUN/Creatinine Ratio 12.5     Anion Gap 8.9 mmol/L      eGFR 106.3 mL/min/1.73     Narrative:      GFR Normal >60  Chronic Kidney Disease <60  Kidney Failure <15      CBC Auto Differential [[id number]]  (Abnormal) Collected: 03/13/24 2216    Specimen: Blood Updated: 03/13/24 2222     WBC 7.58 10*3/mm3      RBC 4.71 10*6/mm3      Hemoglobin 13.1 g/dL      Hematocrit 38.6 %      MCV 82.0 fL      MCH 27.8 pg      MCHC 33.9 g/dL      RDW 13.2 %      RDW-SD 39.5 fl      MPV 10.6 fL      Platelets 350 10*3/mm3      Neutrophil % 39.7 %      Lymphocyte % 49.5 %      Monocyte % 7.4 %      Eosinophil % 2.2 %      Basophil % 0.7 %      Immature Grans % 0.5 %      Neutrophils, Absolute 3.01 10*3/mm3      Lymphocytes, Absolute 3.75 10*3/mm3      Monocytes, Absolute 0.56 10*3/mm3      Eosinophils, Absolute 0.17 10*3/mm3      Basophils, Absolute 0.05 10*3/mm3      Immature Grans, Absolute 0.04 10*3/mm3      nRBC 0.0 /100 WBC     Lipase [[id number]]  (Normal) Collected: 03/13/24 2216    Specimen: Blood Updated: 03/13/24 2239     Lipase 37 U/L     Urinalysis With Microscopic If Indicated (No Culture) - Urine, Clean Catch [[id number]]  (Abnormal) Collected: 03/13/24 2221    Specimen: Urine, Clean Catch Updated: 03/13/24 2237     Color, UA Yellow     Appearance, UA Clear     pH, UA 7.0     Specific Gravity, UA 1.019     Glucose, UA Negative     Ketones, UA Negative     Bilirubin, UA Negative     Blood, UA Large (3+)     Protein, UA Trace     Leuk Esterase, UA Small (1+)     Nitrite, UA  Negative     Urobilinogen, UA 1.0 E.U./dL    Pregnancy, Urine - Urine, Clean Catch [[id number]]  (Normal) Collected: 03/13/24 2221    Specimen: Urine, Clean Catch Updated: 03/13/24 2234     HCG, Urine QL Negative    Urinalysis, Microscopic Only - Urine, Clean Catch [[id number]]  (Abnormal) Collected: 03/13/24 2221    Specimen: Urine, Clean Catch Updated: 03/13/24 2241     RBC, UA 21-50 /HPF      WBC, UA 6-10 /HPF      Bacteria, UA Trace /HPF      Squamous Epithelial Cells, UA 3-6 /HPF      Hyaline Casts, UA None Seen /LPF      Methodology Manual Light Microscopy             US Non-ob Transvaginal    Result Date: 3/13/2024  FINAL REPORT TECHNIQUE: null CLINICAL HISTORY: left sided pelvic pain, hx of ovarian cysts and right oopherectomy COMPARISON: null FINDINGS: US pelvis transvaginal with Doppler Comparison: None Findings: Transvaginal scanning performed with Doppler. Anteverted uterus is 5.0 cm length. Normal myometrium. No endometrial lesion, 8 mm thickness. Multiple small nabothian cysts. Unremarkable cervix. Physiologic quantity of fluid in the endocervical canal is incidental. Right oophorectomy. Left ovary 4.6 x 4.4 x 3.4 cm. Normal color Doppler with arterial/venous spectral tracing of left ovary. Physiologic amount of free fluid in the pelvis.     Impression: IMPRESSION: 1. Unremarkable uterus. 2. Large volume left ovary with multiple follicles. Correlate with the history of polycystic ovary syndrome. Authenticated and Electronically Signed by Beti Bryant MD on 03/13/2024 11:13:50 PM                           Medical Decision Making  Amount and/or Complexity of Data Reviewed  Radiology: ordered.              Kylah Ruiz is a 24 y.o. female who presents to the emergency department for evaluation of left-sided pelvic pain    Differential diagnosis includes cyst, ovarian torsion, ruptured cyst, UTI among other etiologies.    CBC, CMP, lipase, urine pregnancy test, urinalysis ordered for further  evaluation of the patient's presentation.    Chart review if available included outside testing, previous visits, prior labs, prior imaging, available notes from prior evaluations or visits with specialists, medication list, allergies, past medical history, past surgical history when applicable.    Patient was treated with Medications - No data to display    Patient has hematuria but on her menses, noted large left ovary without any evidence of torsion with normal flow per radiologist.  Case labs management and imaging discussed with Dr. Cervnates.  Safe discharge home with outpatient follow-up with strict return to care precautions.  Minimal lower abdominal tenderness, no rebound or guarding.  Patient has follow-up with Clementina Daniels at the [facility] planning for surgery on the left ovary at the end of April.  Did recommend calling tomorrow to have them aware of her visit here and to establish closer follow-up if necessary.    Plan for disposition is to home.  Patient/family comfortable with and understanding of the plan.      Final diagnoses:   Pelvic pain          Venkat Guerra PA-C  03/13/24 2975

## 2024-06-28 ENCOUNTER — APPOINTMENT (OUTPATIENT)
Facility: HOSPITAL | Age: 24
End: 2024-06-28
Payer: MEDICAID

## 2024-06-28 ENCOUNTER — HOSPITAL ENCOUNTER (EMERGENCY)
Facility: HOSPITAL | Age: 24
Discharge: HOME | End: 2024-06-28
Attending: EMERGENCY MEDICINE
Payer: MEDICAID

## 2024-06-28 VITALS
HEIGHT: 69 IN | OXYGEN SATURATION: 100 % | DIASTOLIC BLOOD PRESSURE: 68 MMHG | TEMPERATURE: 98.6 F | HEART RATE: 61 BPM | RESPIRATION RATE: 18 BRPM | SYSTOLIC BLOOD PRESSURE: 115 MMHG | BODY MASS INDEX: 43.4 KG/M2 | WEIGHT: 293 LBS

## 2024-06-28 DIAGNOSIS — D27.1 DERMOID CYST OF LEFT OVARY: ICD-10-CM

## 2024-06-28 DIAGNOSIS — N30.00 ACUTE CYSTITIS WITHOUT HEMATURIA: Primary | ICD-10-CM

## 2024-06-28 DIAGNOSIS — R10.32 LEFT LOWER QUADRANT ABDOMINAL PAIN: ICD-10-CM

## 2024-06-28 LAB
ALBUMIN SERPL-MCNC: 4.3 G/DL (ref 3.5–5.2)
ALBUMIN/GLOB SERPL: 1.3 G/DL
ALP SERPL-CCNC: 87 U/L (ref 39–117)
ALT SERPL W P-5'-P-CCNC: 7 U/L (ref 1–33)
ANION GAP SERPL CALCULATED.3IONS-SCNC: 11.4 MMOL/L (ref 5–15)
AST SERPL-CCNC: 19 U/L (ref 1–32)
BACTERIA UR QL AUTO: ABNORMAL /HPF
BASOPHILS # BLD AUTO: 0.02 10*3/MM3 (ref 0–0.2)
BASOPHILS NFR BLD AUTO: 0.3 % (ref 0–1.5)
BILIRUB SERPL-MCNC: <0.2 MG/DL (ref 0–1.2)
BILIRUB UR QL STRIP: NEGATIVE
BUN SERPL-MCNC: 10 MG/DL (ref 6–20)
BUN/CREAT SERPL: 11.6 (ref 7–25)
CALCIUM SPEC-SCNC: 9.3 MG/DL (ref 8.6–10.5)
CHLORIDE SERPL-SCNC: 104 MMOL/L (ref 98–107)
CLARITY UR: CLEAR
CO2 SERPL-SCNC: 22.6 MMOL/L (ref 22–29)
COLOR UR: YELLOW
CREAT SERPL-MCNC: 0.86 MG/DL (ref 0.57–1)
DEPRECATED RDW RBC AUTO: 41.9 FL (ref 37–54)
EGFRCR SERPLBLD CKD-EPI 2021: 97.5 ML/MIN/1.73
EOSINOPHIL # BLD AUTO: 0.22 10*3/MM3 (ref 0–0.4)
EOSINOPHIL NFR BLD AUTO: 3 % (ref 0.3–6.2)
ERYTHROCYTE [DISTWIDTH] IN BLOOD BY AUTOMATED COUNT: 13.1 % (ref 12.3–15.4)
GLOBULIN UR ELPH-MCNC: 3.2 GM/DL
GLUCOSE SERPL-MCNC: 98 MG/DL (ref 65–99)
GLUCOSE UR STRIP-MCNC: NEGATIVE MG/DL
HCT VFR BLD AUTO: 42.3 % (ref 34–46.6)
HGB BLD-MCNC: 13.6 G/DL (ref 12–15.9)
HGB UR QL STRIP.AUTO: ABNORMAL
HYALINE CASTS UR QL AUTO: ABNORMAL /LPF
IMM GRANULOCYTES # BLD AUTO: 0.01 10*3/MM3 (ref 0–0.05)
IMM GRANULOCYTES NFR BLD AUTO: 0.1 % (ref 0–0.5)
KETONES UR QL STRIP: NEGATIVE
LEUKOCYTE ESTERASE UR QL STRIP.AUTO: NEGATIVE
LYMPHOCYTES # BLD AUTO: 3.67 10*3/MM3 (ref 0.7–3.1)
LYMPHOCYTES NFR BLD AUTO: 49.8 % (ref 19.6–45.3)
MCH RBC QN AUTO: 27.6 PG (ref 26.6–33)
MCHC RBC AUTO-ENTMCNC: 32.2 G/DL (ref 31.5–35.7)
MCV RBC AUTO: 85.8 FL (ref 79–97)
MONOCYTES # BLD AUTO: 0.41 10*3/MM3 (ref 0.1–0.9)
MONOCYTES NFR BLD AUTO: 5.6 % (ref 5–12)
MUCOUS THREADS URNS QL MICRO: ABNORMAL /HPF
NEUTROPHILS NFR BLD AUTO: 3.04 10*3/MM3 (ref 1.7–7)
NEUTROPHILS NFR BLD AUTO: 41.2 % (ref 42.7–76)
NITRITE UR QL STRIP: NEGATIVE
PH UR STRIP.AUTO: 6.5 [PH] (ref 5–8)
PLATELET # BLD AUTO: 308 10*3/MM3 (ref 140–450)
PMV BLD AUTO: 10.9 FL (ref 6–12)
POTASSIUM SERPL-SCNC: 3.9 MMOL/L (ref 3.5–5.2)
PROT SERPL-MCNC: 7.5 G/DL (ref 6–8.5)
PROT UR QL STRIP: NEGATIVE
RBC # BLD AUTO: 4.93 10*6/MM3 (ref 3.77–5.28)
RBC # UR STRIP: ABNORMAL /HPF
REF LAB TEST METHOD: <0.2
REF LAB TEST METHOD: ABNORMAL
SODIUM SERPL-SCNC: 138 MMOL/L (ref 136–145)
SP GR UR STRIP: 1.02 (ref 1–1.03)
SQUAMOUS #/AREA URNS HPF: ABNORMAL /HPF
UROBILINOGEN UR QL STRIP: ABNORMAL
WBC # UR STRIP: ABNORMAL /HPF
WBC NRBC COR # BLD AUTO: 7.37 10*3/MM3 (ref 3.4–10.8)

## 2024-06-28 PROCEDURE — 96374 THER/PROPH/DIAG INJ IV PUSH: CPT

## 2024-06-28 PROCEDURE — 25010000002 MORPHINE PER 10 MG: Performed by: EMERGENCY MEDICINE

## 2024-06-28 PROCEDURE — 25010000002 KETOROLAC TROMETHAMINE PER 15 MG: Performed by: EMERGENCY MEDICINE

## 2024-06-28 PROCEDURE — 81001 URINALYSIS AUTO W/SCOPE: CPT | Performed by: EMERGENCY MEDICINE

## 2024-06-28 PROCEDURE — 25010000002 ONDANSETRON PER 1 MG: Performed by: EMERGENCY MEDICINE

## 2024-06-28 PROCEDURE — 80053 COMPREHEN METABOLIC PANEL: CPT | Performed by: EMERGENCY MEDICINE

## 2024-06-28 PROCEDURE — 93976 VASCULAR STUDY: CPT

## 2024-06-28 PROCEDURE — 99284 EMERGENCY DEPT VISIT MOD MDM: CPT

## 2024-06-28 PROCEDURE — 96375 TX/PRO/DX INJ NEW DRUG ADDON: CPT

## 2024-06-28 PROCEDURE — 85025 COMPLETE CBC W/AUTO DIFF WBC: CPT | Performed by: EMERGENCY MEDICINE

## 2024-06-28 PROCEDURE — 76830 TRANSVAGINAL US NON-OB: CPT

## 2024-06-28 RX ORDER — NITROFURANTOIN MACROCRYSTALS 50 MG/1
100 CAPSULE ORAL ONCE
Status: COMPLETED | OUTPATIENT
Start: 2024-06-28 | End: 2024-06-28

## 2024-06-28 RX ORDER — NITROFURANTOIN 25; 75 MG/1; MG/1
100 CAPSULE ORAL 2 TIMES DAILY
Qty: 14 CAPSULE | Refills: 0 | Status: SHIPPED | OUTPATIENT
Start: 2024-06-28 | End: 2024-07-05

## 2024-06-28 RX ORDER — NITROFURANTOIN 25; 75 MG/1; MG/1
100 CAPSULE ORAL ONCE
Status: DISCONTINUED | OUTPATIENT
Start: 2024-06-28 | End: 2024-06-28 | Stop reason: CLARIF

## 2024-06-28 RX ORDER — KETOROLAC TROMETHAMINE 15 MG/ML
15 INJECTION, SOLUTION INTRAMUSCULAR; INTRAVENOUS ONCE
Status: COMPLETED | OUTPATIENT
Start: 2024-06-28 | End: 2024-06-28

## 2024-06-28 RX ORDER — ONDANSETRON 2 MG/ML
4 INJECTION INTRAMUSCULAR; INTRAVENOUS ONCE
Status: COMPLETED | OUTPATIENT
Start: 2024-06-28 | End: 2024-06-28

## 2024-06-28 RX ADMIN — KETOROLAC TROMETHAMINE 15 MG: 15 INJECTION, SOLUTION INTRAMUSCULAR; INTRAVENOUS at 04:44

## 2024-06-28 RX ADMIN — MORPHINE SULFATE 4 MG: 4 INJECTION, SOLUTION INTRAMUSCULAR; INTRAVENOUS at 03:44

## 2024-06-28 RX ADMIN — NITROFURANTOIN MACROCRYSTALS 100 MG: 50 CAPSULE ORAL at 04:45

## 2024-06-28 RX ADMIN — ONDANSETRON 4 MG: 2 INJECTION INTRAMUSCULAR; INTRAVENOUS at 03:45

## 2024-06-28 NOTE — FSED PROVIDER NOTE
Subjective  History of Present Illness:    Patient presents to the emergency department with left lower quadrant abdominal tenderness which started yesterday.  Denies any fever, chills, cough, congestion, nausea, vomiting, diarrhea, constipation, dysuria, hematuria or frequency.  She believes this may be ovarian in nature.  She has had multiple ovarian cysts in the past and states that she has had a complete right-sided oophorectomy secondary to large cyst.  She describes having pain worsened with being upright and ambulating.  She states that it is slightly better with frontal flexion.    Nurses Notes reviewed and agree, including vitals, allergies, social history and prior medical history.     REVIEW OF SYSTEMS: All systems reviewed and not pertinent unless noted.  Review of Systems   Gastrointestinal:  Positive for abdominal pain.   All other systems reviewed and are negative.      Past Medical History:   Diagnosis Date    Asthma     Bipolar 1 disorder     Depression        Allergies:    Patient has no known allergies.      Past Surgical History:   Procedure Laterality Date    CYST REMOVAL Left     ovary    MOUTH SURGERY      RIGHT OOPHORECTOMY      TYMPANOSTOMY TUBE PLACEMENT           Social History     Socioeconomic History    Marital status: Single   Tobacco Use    Smoking status: Never    Smokeless tobacco: Never   Vaping Use    Vaping status: Every Day    Substances: Nicotine, Flavoring    Devices: Refillable tank   Substance and Sexual Activity    Alcohol use: No    Drug use: No    Sexual activity: Never     Birth control/protection: None         Family History   Problem Relation Age of Onset    Migraines Mother     Mental illness Mother     Coronary artery disease Father     Migraines Sister     Bipolar disorder Brother     Hypertension Maternal Grandmother     Diabetes Maternal Grandmother     Coronary artery disease Maternal Grandmother     Cancer Maternal Grandfather     Lung cancer Maternal  "Grandfather     Hypertension Maternal Grandfather     Cancer Paternal Grandmother     Bone cancer Paternal Grandmother     Colon cancer Paternal Grandmother     Coronary artery disease Paternal Grandfather     Clotting disorder Paternal Grandfather     Coronary artery disease Other     Breast cancer Other     Bipolar disorder Half-Sister        Objective  Physical Exam:  /95   Pulse 70   Temp 98.6 °F (37 °C) (Oral)   Resp 18   Ht 175.3 cm (69\")   Wt (!) 140 kg (308 lb)   LMP 06/01/2024 (Approximate)   SpO2 99%   BMI 45.48 kg/m²      Physical Exam  Vitals and nursing note reviewed.   Constitutional:       General: She is not in acute distress.     Appearance: Normal appearance. She is obese. She is not ill-appearing, toxic-appearing or diaphoretic.      Comments: Uncomfortable appearing   HENT:      Head: Normocephalic.      Mouth/Throat:      Mouth: Mucous membranes are moist.   Eyes:      Extraocular Movements: Extraocular movements intact.      Conjunctiva/sclera: Conjunctivae normal.      Pupils: Pupils are equal, round, and reactive to light.   Cardiovascular:      Rate and Rhythm: Normal rate and regular rhythm.      Pulses: Normal pulses.      Heart sounds: Normal heart sounds.   Pulmonary:      Effort: Pulmonary effort is normal.      Breath sounds: Normal breath sounds.   Abdominal:      General: Abdomen is flat.      Palpations: Abdomen is soft.      Comments: Left lower quadrant abdominal tenderness with guarding.  No rebound.  Normal bowel sounds.   Musculoskeletal:         General: Normal range of motion.   Skin:     General: Skin is warm.      Capillary Refill: Capillary refill takes less than 2 seconds.   Neurological:      General: No focal deficit present.      Mental Status: She is alert and oriented to person, place, and time.   Psychiatric:         Mood and Affect: Mood normal.         Behavior: Behavior normal.         Thought Content: Thought content normal.         Judgment: " Judgment normal.         Procedures    ED Course:         Lab Results (last 24 hours)       ** No results found for the last 24 hours. **             US Non-ob Transvaginal    Result Date: 6/28/2024  US NON-OB TRANSVAGINAL, US TESTICULAR OR OVARIAN VASCULAR LIMITED Date of Exam: 6/28/2024 2:00 AM EDT Indication: LLQ pain, prior right oophorectomy from cysts. Comparison: 3/13/2024. Technique: Transvaginal pelvic ultrasound was performed.  Grayscale and color Doppler imaging was utilized to evaluate the pelvic area with image documentation per protocol.  Doppler spectral analysis was performed. Findings: The uterus measures up to 7.6 cm. The endometrial thickness is 8 mm. The right ovary has been resected. The left ovary appears normal in size. Benign-appearing follicles are present. Normal vascular flow is present. No adnexal masses identified. Small area of focal increased echogenicity measuring up to 1.6 cm.. No significant free fluid.     Impression: Impression: No acute sonographic abnormality within the pelvis. No evidence of torsion. Small area of increased echogenicity within the left ovary likely related to a small dermoid or teratoma. No sizable mass identified.. Electronically Signed: Sunitha Villa MD  6/28/2024 4:21 AM EDT  Workstation ID: [id number]    US Testicular or Ovarian Vascular Limited    Result Date: 6/28/2024  US NON-OB TRANSVAGINAL, US TESTICULAR OR OVARIAN VASCULAR LIMITED Date of Exam: 6/28/2024 2:00 AM EDT Indication: LLQ pain, prior right oophorectomy from cysts. Comparison: 3/13/2024. Technique: Transvaginal pelvic ultrasound was performed.  Grayscale and color Doppler imaging was utilized to evaluate the pelvic area with image documentation per protocol.  Doppler spectral analysis was performed. Findings: The uterus measures up to 7.6 cm. The endometrial thickness is 8 mm. The right ovary has been resected. The left ovary appears normal in size. Benign-appearing follicles are present. Normal  vascular flow is present. No adnexal masses identified. Small area of focal increased echogenicity measuring up to 1.6 cm.. No significant free fluid.     Impression: Impression: No acute sonographic abnormality within the pelvis. No evidence of torsion. Small area of increased echogenicity within the left ovary likely related to a small dermoid or teratoma. No sizable mass identified.. Electronically Signed: Sunitha Villa MD  6/28/2024 4:21 AM EDT  Workstation ID: [id number]        [facility]  Number of Diagnoses or Management Options  Acute cystitis without hematuria  Diagnosis management comments: Patient was evaluated labs were drawn.  Patient's labs are nonconcerning at this time.  Urinary analysis was negative.  She was sent for transvaginal ultrasound to rule out torsion especially with her only having a solitary ovary.  She was given morphine for pain.  Patient was ultrasound was negative for torsion.  There was a concern for possible dermoid cyst on the left ovary.  She was made aware of this and she has follow-up in the near future with her OB/GYN at the [facility].  She was found to have a urinary tract infection was treated with Macrobid and given Toradol prior to discharge.        Medications   ketorolac (TORADOL) injection 15 mg (has no administration in time range)   nitrofurantoin (macrocrystal-monohydrate) (MACROBID) capsule 100 mg (has no administration in time range)   morphine injection 4 mg (4 mg Intravenous Given 6/28/24 0344)   ondansetron (ZOFRAN) injection 4 mg (4 mg Intravenous Given 6/28/24 0345)       Data interpreted: Nursing notes reviewed, vital signs reviewed.  Labs independently interpreted by me (CBC, CMP, lipase, UA, troponin, ABG, lactic acid, procalcitonin).  Imaging independently interpreted by me (x-ray, CT scan).  EKG independently interpreted by me.  O2 saturation:    Counseling: Discussed the results above with the patient regarding need for admission or discharge.   Patient understands and agrees plan of care.      -----  ED Disposition       ED Disposition   Discharge    Condition   Stable    Comment   --             Final diagnoses:   Acute cystitis without hematuria   Dermoid cyst of left ovary   Left lower quadrant abdominal pain      Your Follow-Up Providers       Belem West APRN. Call in 2 days.    Specialty: Nurse Practitioner  [address]  [place] [address]  [phone]                       Contact information for after-discharge care    Follow-up information has not been specified.                    Your medication list        START taking these medications        Instructions Last Dose Given Next Dose Due   nitrofurantoin (macrocrystal-monohydrate) 100 MG capsule  Commonly known as: MACROBID      Take 1 capsule by mouth 2 (Two) Times a Day for 7 days.              CONTINUE taking these medications        Instructions Last Dose Given Next Dose Due   BD ULTRA-FINE PEN NEEDLES 29G X 12.7MM misc  Generic drug: Insulin Pen Needle           clotrimazole-betamethasone 1-0.05 % cream  Commonly known as: LOTRISONE      Apply 1 Application topically to the appropriate area as directed 2 (Two) Times a Day.       desvenlafaxine 50 MG 24 hr tablet  Commonly known as: PRISTIQ      Take 1 tablet by mouth Daily.       HYDROcodone-acetaminophen 5-325 MG per tablet  Commonly known as: NORCO      Take 1 tablet by mouth Every 6 (Six) Hours As Needed for Severe Pain.       Iron (Ferrous Sulfate) 325 (65 Fe) MG tablet      Take 325 mg by mouth Daily.       ketorolac 10 MG tablet  Commonly known as: TORADOL      Take 1 tablet by mouth Every 6 (Six) Hours As Needed for Moderate Pain.       lamoTRIgine 25 MG tablet  Commonly known as: LaMICtal      Take 3 tablets by mouth Daily.       ondansetron ODT 4 MG disintegrating tablet  Commonly known as: ZOFRAN-ODT      Place 1 tablet under the tongue Every 6 (Six) Hours As Needed for Vomiting or Nausea.       Victoza 18 MG/3ML solution  pen-injector injection  Generic drug: Liraglutide      Inject 1.2 mg under the skin into the appropriate area as directed Daily.       vitamin B-12 1000 MCG tablet  Commonly known as: CYANOCOBALAMIN      Take 1 tablet by mouth Daily.       vitamin D 1.25 MG (59014 UT) capsule capsule  Commonly known as: ERGOCALCIFEROL      Take 1 capsule by mouth Every 7 (Seven) Days.                 Where to Get Your Medications        These medications were sent to [id number] - [place], [place] - [address] AT [facility] [address] - [phone] PH - [phone] FX  [address], [place] [address]      Phone: [phone]   nitrofurantoin (macrocrystal-monohydrate) 100 MG capsule

## 2024-06-28 NOTE — Clinical Note
[facility] EMERGENCY DEPARTMENT [place]  [address]  [facility] [address]  Phone: [phone]  Fax: [phone]    Kylah Ruiz was seen and treated in our emergency department on 6/28/2024.  She may return to work on 06/29/2024.         Thank you for choosing [facility].    Sebastián Khan, DO

## 2024-07-02 ENCOUNTER — HOSPITAL ENCOUNTER (EMERGENCY)
Facility: HOSPITAL | Age: 24
Discharge: HOME | End: 2024-07-02
Attending: FAMILY MEDICINE
Payer: MEDICAID

## 2024-07-02 VITALS
WEIGHT: 293 LBS | RESPIRATION RATE: 20 BRPM | DIASTOLIC BLOOD PRESSURE: 67 MMHG | HEIGHT: 71 IN | SYSTOLIC BLOOD PRESSURE: 135 MMHG | HEART RATE: 86 BPM | BODY MASS INDEX: 41.02 KG/M2 | TEMPERATURE: 98.3 F | OXYGEN SATURATION: 99 %

## 2024-07-02 DIAGNOSIS — N92.0 MENORRHAGIA WITH REGULAR CYCLE: Primary | ICD-10-CM

## 2024-07-02 LAB
B-HCG UR QL: NEGATIVE
BASOPHILS # BLD AUTO: 0.03 10*3/MM3 (ref 0–0.2)
BASOPHILS NFR BLD AUTO: 0.4 % (ref 0–1.5)
DEPRECATED RDW RBC AUTO: 39.8 FL (ref 37–54)
EOSINOPHIL # BLD AUTO: 0.16 10*3/MM3 (ref 0–0.4)
EOSINOPHIL NFR BLD AUTO: 2.2 % (ref 0.3–6.2)
ERYTHROCYTE [DISTWIDTH] IN BLOOD BY AUTOMATED COUNT: 12.7 % (ref 12.3–15.4)
EXPIRATION DATE: NORMAL
HCG INTACT+B SERPL-ACNC: <0.2 MIU/ML
HCT VFR BLD AUTO: 40.8 % (ref 34–46.6)
HGB BLD-MCNC: 13.3 G/DL (ref 12–15.9)
HOLD SPECIMEN: NORMAL
IMM GRANULOCYTES # BLD AUTO: 0.01 10*3/MM3 (ref 0–0.05)
IMM GRANULOCYTES NFR BLD AUTO: 0.1 % (ref 0–0.5)
INTERNAL NEGATIVE CONTROL: NEGATIVE
INTERNAL POSITIVE CONTROL: POSITIVE
LYMPHOCYTES # BLD AUTO: 2.78 10*3/MM3 (ref 0.7–3.1)
LYMPHOCYTES NFR BLD AUTO: 38.2 % (ref 19.6–45.3)
Lab: NORMAL
MCH RBC QN AUTO: 27.6 PG (ref 26.6–33)
MCHC RBC AUTO-ENTMCNC: 32.6 G/DL (ref 31.5–35.7)
MCV RBC AUTO: 84.6 FL (ref 79–97)
MONOCYTES # BLD AUTO: 0.39 10*3/MM3 (ref 0.1–0.9)
MONOCYTES NFR BLD AUTO: 5.4 % (ref 5–12)
NEUTROPHILS NFR BLD AUTO: 3.91 10*3/MM3 (ref 1.7–7)
NEUTROPHILS NFR BLD AUTO: 53.7 % (ref 42.7–76)
PLATELET # BLD AUTO: 278 10*3/MM3 (ref 140–450)
PMV BLD AUTO: 11.2 FL (ref 6–12)
RBC # BLD AUTO: 4.82 10*6/MM3 (ref 3.77–5.28)
WBC NRBC COR # BLD AUTO: 7.28 10*3/MM3 (ref 3.4–10.8)
WHOLE BLOOD HOLD COAG: NORMAL
WHOLE BLOOD HOLD SPECIMEN: NORMAL

## 2024-07-02 PROCEDURE — 99283 EMERGENCY DEPT VISIT LOW MDM: CPT

## 2024-07-02 PROCEDURE — 81025 URINE PREGNANCY TEST: CPT | Performed by: FAMILY MEDICINE

## 2024-07-02 PROCEDURE — 84702 CHORIONIC GONADOTROPIN TEST: CPT | Performed by: FAMILY MEDICINE

## 2024-07-02 PROCEDURE — 85025 COMPLETE CBC W/AUTO DIFF WBC: CPT | Performed by: FAMILY MEDICINE

## 2024-07-02 RX ORDER — SODIUM CHLORIDE 0.9 % (FLUSH) 0.9 %
10 SYRINGE (ML) INJECTION AS NEEDED
Status: DISCONTINUED | OUTPATIENT
Start: 2024-07-02 | End: 2024-07-03 | Stop reason: HOSPADM

## 2024-07-03 NOTE — FSED PROVIDER NOTE
Subjective  History of Present Illness:    24-year-old female presenting today for evaluation of heavy menses.  Patient reports that she has a history of PCOS and had a surgery to remove several cysts performed approximately a month ago.  She was seen here last week for evaluation of pelvic pain and was found to have a dermoid cyst.  She called her primary gynecologist who requested that she present to the emergency department for further evaluation.  During an 8-hour period the patient bled through 5 heavy pads with some large clots.  Patient denies any pain at proportion to her normal menses.      Nurses Notes reviewed and agree, including vitals, allergies, social history and prior medical history.     REVIEW OF SYSTEMS: All systems reviewed and not pertinent unless noted.  Review of Systems   Constitutional:  Negative for chills, fatigue and fever.   HENT:  Negative for ear pain, rhinorrhea, sinus pain and sore throat.    Respiratory:  Negative for cough and shortness of breath.    Cardiovascular:  Negative for chest pain and palpitations.   Gastrointestinal:  Negative for diarrhea, nausea and vomiting.   Genitourinary:  Positive for pelvic pain and vaginal bleeding.   Neurological:  Negative for syncope, weakness, light-headedness and headaches.   All other systems reviewed and are negative.      Past Medical History:   Diagnosis Date    Asthma     Bipolar 1 disorder     Depression        Allergies:    Patient has no known allergies.      Past Surgical History:   Procedure Laterality Date    CYST REMOVAL Left     ovary    MOUTH SURGERY      RIGHT OOPHORECTOMY      TYMPANOSTOMY TUBE PLACEMENT           Social History     Socioeconomic History    Marital status: Single   Tobacco Use    Smoking status: Never    Smokeless tobacco: Never   Vaping Use    Vaping status: Every Day    Substances: Nicotine, Flavoring    Devices: Refillable tank   Substance and Sexual Activity    Alcohol use: No    Drug use: No    Sexual  "activity: Never     Birth control/protection: None         Family History   Problem Relation Age of Onset    Migraines Mother     Mental illness Mother     Coronary artery disease Father     Migraines Sister     Bipolar disorder Brother     Hypertension Maternal Grandmother     Diabetes Maternal Grandmother     Coronary artery disease Maternal Grandmother     Cancer Maternal Grandfather     Lung cancer Maternal Grandfather     Hypertension Maternal Grandfather     Cancer Paternal Grandmother     Bone cancer Paternal Grandmother     Colon cancer Paternal Grandmother     Coronary artery disease Paternal Grandfather     Clotting disorder Paternal Grandfather     Coronary artery disease Other     Breast cancer Other     Bipolar disorder Half-Sister        Objective  Physical Exam:  /67   Pulse 86   Temp 98.3 °F (36.8 °C) (Oral)   Resp 20   Ht 180.3 cm (71\")   Wt (!) 141 kg (311 lb)   LMP 06/29/2024 (Approximate)   SpO2 99%   BMI 43.38 kg/m²      Physical Exam  Vitals and nursing note reviewed.   Constitutional:       Appearance: Normal appearance. She is obese.   HENT:      Head: Normocephalic and atraumatic.      Right Ear: Tympanic membrane, ear canal and external ear normal.      Left Ear: Tympanic membrane, ear canal and external ear normal.      Nose: Nose normal.      Mouth/Throat:      Mouth: Mucous membranes are moist.      Pharynx: Oropharynx is clear.   Eyes:      Extraocular Movements: Extraocular movements intact.      Conjunctiva/sclera: Conjunctivae normal.      Pupils: Pupils are equal, round, and reactive to light.   Cardiovascular:      Rate and Rhythm: Normal rate and regular rhythm.      Pulses: Normal pulses.      Heart sounds: Normal heart sounds.   Pulmonary:      Effort: Pulmonary effort is normal.      Breath sounds: Normal breath sounds.   Abdominal:      General: Abdomen is flat. Bowel sounds are normal.      Palpations: Abdomen is soft.   Musculoskeletal:         General: Normal " range of motion.      Cervical back: Normal range of motion and neck supple.   Skin:     General: Skin is warm.      Capillary Refill: Capillary refill takes less than 2 seconds.   Neurological:      General: No focal deficit present.      Mental Status: She is alert and oriented to person, place, and time. Mental status is at baseline.   Psychiatric:         Mood and Affect: Mood normal.         Behavior: Behavior normal.         Thought Content: Thought content normal.         Judgment: Judgment normal.         Procedures    ED Course:         Lab Results (last 24 hours)       Procedure Component Value Units Date/Time    CBC & Differential [[id number]]  (Normal) Collected: 07/02/24 1947    Specimen: Blood Updated: 07/02/24 2003    Narrative:      The following orders were created for panel order CBC & Differential.  Procedure                               Abnormality         Status                     ---------                               -----------         ------                     CBC Auto Differential[[id number]]        Normal              Final result                 Please view results for these tests on the individual orders.    hCG, Quantitative, Pregnancy [[id number]] Collected: 07/02/24 1947    Specimen: Blood Updated: 07/02/24 2030     HCG Quantitative <0.20 mIU/mL     Narrative:      HCG Ranges by Gestational Age    Females - non-pregnant premenopausal   </= 1mIU/mL HCG  Females - postmenopausal               </= 7mIU/mL HCG    3 Weeks         5.8 -    71.2 mIU/mL  4 Weeks         9.5 -     750 mIU/mL  5 Weeks         217 -   7,138 mIU/mL  6 Weeks         158 -  31,795 mIU/mL  7 Weeks       3,697 - 163,563 mIU/mL  8 Weeks      32,065 - 149,571 mIU/mL  9 Weeks      63,803 - 151,410 mIU/mL  10 Weeks     46,509 - 186,977 mIU/mL  12 Weeks     27,832 - 210,612 mIU/mL  14 Weeks     13,950 -  62,530 mIU/mL  15 Weeks     12,039 -  70,971 mIU/mL  16 Weeks      9,040 -  56,451 mIU/mL  17 Weeks      8,175 -   55,868 mIU/mL  18 Weeks      8,099 -  58,176 mIU/mL  Results may be falsely decreased if patient taking Biotin.      CBC Auto Differential [[id number]]  (Normal) Collected: 07/02/24 1947    Specimen: Blood Updated: 07/02/24 2003     WBC 7.28 10*3/mm3      RBC 4.82 10*6/mm3      Hemoglobin 13.3 g/dL      Hematocrit 40.8 %      MCV 84.6 fL      MCH 27.6 pg      MCHC 32.6 g/dL      RDW 12.7 %      RDW-SD 39.8 fl      MPV 11.2 fL      Platelets 278 10*3/mm3      Neutrophil % 53.7 %      Lymphocyte % 38.2 %      Monocyte % 5.4 %      Eosinophil % 2.2 %      Basophil % 0.4 %      Immature Grans % 0.1 %      Neutrophils, Absolute 3.91 10*3/mm3      Lymphocytes, Absolute 2.78 10*3/mm3      Monocytes, Absolute 0.39 10*3/mm3      Eosinophils, Absolute 0.16 10*3/mm3      Basophils, Absolute 0.03 10*3/mm3      Immature Grans, Absolute 0.01 10*3/mm3     POC Urine Pregnancy [[id number]] Collected: 07/02/24 2011    Specimen: Urine Updated: 07/02/24 2011     HCG, Urine, QL Negative     Lot Number [id number]     Internal Positive Control Positive     Internal Negative Control Negative     Expiration Date 1/28/25             No radiology results from the last 24 hrs       MDM     Amount and/or Complexity of Data Reviewed  Clinical lab tests: reviewed        Initial impression of presenting illness: 24-year-old obese female with a history of PCOS presenting today for evaluation of heavy menses.    DDX: includes but is not limited to: Anemia, PCOS, pregnancy    Patient arrives ambulatory, afebrile, normotensive with heart rate 97 with vitals interpreted by myself.     Pertinent features from physical exam: Mild pelvic pain consistent with menses.    Initial diagnostic plan: CBC and urine pregnancy    Results from initial plan were reviewed and interpreted by me revealing normal evaluation    Diagnostic information from other sources: None    Interventions / Re-evaluation: None    Medications   sodium chloride 0.9 % flush 10 mL (has no  administration in time range)       Results/clinical rationale were discussed with patient    Consultations/Discussion of results with other physicians: None    Data interpreted: Nursing notes reviewed, vital signs reviewed.  Labs independently interpreted by me (CBC).  O2 saturation: 99% room air    Counseling: Discussed the results above with the patient regarding need for admission or discharge.  Patient understands and agrees plan of care.      -----  ED Disposition       ED Disposition   Discharge    Condition   Stable    Comment   --             Final diagnoses:   Menorrhagia with regular cycle      Your Follow-Up Providers       Belem West APRN. Schedule an appointment as soon as possible for a visit in 3 days.    Specialty: Nurse Practitioner  Follow up details: If symptoms worsen, For further evaluation  [address]  [place] [address] [phone]                       Contact information for after-discharge care    Follow-up information has not been specified.                    Your medication list        CONTINUE taking these medications        Instructions Last Dose Given Next Dose Due   BD ULTRA-FINE PEN NEEDLES 29G X 12.7MM misc  Generic drug: Insulin Pen Needle           clotrimazole-betamethasone 1-0.05 % cream  Commonly known as: LOTRISONE      Apply 1 Application topically to the appropriate area as directed 2 (Two) Times a Day.       desvenlafaxine 50 MG 24 hr tablet  Commonly known as: PRISTIQ      Take 1 tablet by mouth Daily.       HYDROcodone-acetaminophen 5-325 MG per tablet  Commonly known as: NORCO      Take 1 tablet by mouth Every 6 (Six) Hours As Needed for Severe Pain.       Iron (Ferrous Sulfate) 325 (65 Fe) MG tablet      Take 325 mg by mouth Daily.       ketorolac 10 MG tablet  Commonly known as: TORADOL      Take 1 tablet by mouth Every 6 (Six) Hours As Needed for Moderate Pain.       lamoTRIgine 25 MG tablet  Commonly known as: LaMICtal      Take 3 tablets by mouth  Daily.       nitrofurantoin (macrocrystal-monohydrate) 100 MG capsule  Commonly known as: MACROBID      Take 1 capsule by mouth 2 (Two) Times a Day for 7 days.       ondansetron ODT 4 MG disintegrating tablet  Commonly known as: ZOFRAN-ODT      Place 1 tablet under the tongue Every 6 (Six) Hours As Needed for Vomiting or Nausea.       Victoza 18 MG/3ML solution pen-injector injection  Generic drug: Liraglutide      Inject 1.2 mg under the skin into the appropriate area as directed Daily.       vitamin B-12 1000 MCG tablet  Commonly known as: CYANOCOBALAMIN      Take 1 tablet by mouth Daily.       vitamin D 1.25 MG (11676 UT) capsule capsule  Commonly known as: ERGOCALCIFEROL      Take 1 capsule by mouth Every 7 (Seven) Days.

## 2024-08-22 ENCOUNTER — LAB (OUTPATIENT)
Dept: LAB | Facility: HOSPITAL | Age: 24
End: 2024-08-22
Payer: MEDICAID

## 2024-08-22 ENCOUNTER — TRANSCRIBE ORDERS (OUTPATIENT)
Dept: LAB | Facility: HOSPITAL | Age: 24
End: 2024-08-22
Payer: MEDICAID

## 2024-08-22 DIAGNOSIS — R30.0 DYSURIA: ICD-10-CM

## 2024-08-22 DIAGNOSIS — E11.9 DIABETES MELLITUS WITHOUT COMPLICATION: ICD-10-CM

## 2024-08-22 DIAGNOSIS — D64.9 ANEMIA, UNSPECIFIED TYPE: ICD-10-CM

## 2024-08-22 DIAGNOSIS — R11.0 NAUSEA: ICD-10-CM

## 2024-08-22 DIAGNOSIS — R10.9 STOMACH ACHE: Primary | ICD-10-CM

## 2024-08-22 DIAGNOSIS — R51.9 NONINTRACTABLE HEADACHE, UNSPECIFIED CHRONICITY PATTERN, UNSPECIFIED HEADACHE TYPE: ICD-10-CM

## 2024-08-22 DIAGNOSIS — R69 CHRONIC ILLNESS: ICD-10-CM

## 2024-08-22 DIAGNOSIS — R53.83 TIREDNESS: ICD-10-CM

## 2024-08-22 DIAGNOSIS — R55 SYNCOPE AND COLLAPSE: ICD-10-CM

## 2024-08-22 DIAGNOSIS — R10.9 STOMACH ACHE: ICD-10-CM

## 2024-08-22 LAB
BASOPHILS # BLD AUTO: 0.02 10*3/MM3 (ref 0–0.2)
BASOPHILS NFR BLD AUTO: 0.2 % (ref 0–1.5)
CRP SERPL-MCNC: <0.3 MG/DL (ref 0–0.5)
DEPRECATED RDW RBC AUTO: 38.5 FL (ref 37–54)
EOSINOPHIL # BLD AUTO: 0.08 10*3/MM3 (ref 0–0.4)
EOSINOPHIL NFR BLD AUTO: 1 % (ref 0.3–6.2)
ERYTHROCYTE [DISTWIDTH] IN BLOOD BY AUTOMATED COUNT: 13 % (ref 12.3–15.4)
ERYTHROCYTE [SEDIMENTATION RATE] IN BLOOD: 22 MM/HR (ref 0–20)
FERRITIN SERPL-MCNC: 56.5 NG/ML (ref 13–150)
HBA1C MFR BLD: 5.3 % (ref 4.8–5.6)
HCT VFR BLD AUTO: 42.7 % (ref 34–46.6)
HGB BLD-MCNC: 14.2 G/DL (ref 12–15.9)
IMM GRANULOCYTES # BLD AUTO: 0.03 10*3/MM3 (ref 0–0.05)
IMM GRANULOCYTES NFR BLD AUTO: 0.4 % (ref 0–0.5)
LYMPHOCYTES # BLD AUTO: 2.75 10*3/MM3 (ref 0.7–3.1)
LYMPHOCYTES NFR BLD AUTO: 33.1 % (ref 19.6–45.3)
MCH RBC QN AUTO: 27.5 PG (ref 26.6–33)
MCHC RBC AUTO-ENTMCNC: 33.3 G/DL (ref 31.5–35.7)
MCV RBC AUTO: 82.8 FL (ref 79–97)
MONOCYTES # BLD AUTO: 0.29 10*3/MM3 (ref 0.1–0.9)
MONOCYTES NFR BLD AUTO: 3.5 % (ref 5–12)
NEUTROPHILS NFR BLD AUTO: 5.13 10*3/MM3 (ref 1.7–7)
NEUTROPHILS NFR BLD AUTO: 61.8 % (ref 42.7–76)
NRBC BLD AUTO-RTO: 0 /100 WBC (ref 0–0.2)
PLATELET # BLD AUTO: 291 10*3/MM3 (ref 140–450)
PMV BLD AUTO: 11.5 FL (ref 6–12)
RBC # BLD AUTO: 5.16 10*6/MM3 (ref 3.77–5.28)
VIT B12 BLD-MCNC: 504 PG/ML (ref 211–946)
WBC NRBC COR # BLD AUTO: 8.3 10*3/MM3 (ref 3.4–10.8)

## 2024-08-22 PROCEDURE — 86140 C-REACTIVE PROTEIN: CPT

## 2024-08-22 PROCEDURE — 86800 THYROGLOBULIN ANTIBODY: CPT

## 2024-08-22 PROCEDURE — 82607 VITAMIN B-12: CPT

## 2024-08-22 PROCEDURE — 86940 HEMOLYSINS/AGGLUTININS AUTO: CPT

## 2024-08-22 PROCEDURE — 82784 ASSAY IGA/IGD/IGG/IGM EACH: CPT

## 2024-08-22 PROCEDURE — 84466 ASSAY OF TRANSFERRIN: CPT

## 2024-08-22 PROCEDURE — 86038 ANTINUCLEAR ANTIBODIES: CPT

## 2024-08-22 PROCEDURE — 84481 FREE ASSAY (FT-3): CPT

## 2024-08-22 PROCEDURE — 86431 RHEUMATOID FACTOR QUANT: CPT

## 2024-08-22 PROCEDURE — 36415 COLL VENOUS BLD VENIPUNCTURE: CPT

## 2024-08-22 PROCEDURE — 80061 LIPID PANEL: CPT

## 2024-08-22 PROCEDURE — 84439 ASSAY OF FREE THYROXINE: CPT

## 2024-08-22 PROCEDURE — 82785 ASSAY OF IGE: CPT

## 2024-08-22 PROCEDURE — 85025 COMPLETE CBC W/AUTO DIFF WBC: CPT

## 2024-08-22 PROCEDURE — 83540 ASSAY OF IRON: CPT

## 2024-08-22 PROCEDURE — 84443 ASSAY THYROID STIM HORMONE: CPT

## 2024-08-22 PROCEDURE — 83036 HEMOGLOBIN GLYCOSYLATED A1C: CPT

## 2024-08-22 PROCEDURE — 86162 COMPLEMENT TOTAL (CH50): CPT

## 2024-08-22 PROCEDURE — 85652 RBC SED RATE AUTOMATED: CPT

## 2024-08-22 PROCEDURE — 80053 COMPREHEN METABOLIC PANEL: CPT

## 2024-08-22 PROCEDURE — 82728 ASSAY OF FERRITIN: CPT

## 2024-08-22 PROCEDURE — 86376 MICROSOMAL ANTIBODY EACH: CPT

## 2024-08-23 LAB
ALBUMIN SERPL-MCNC: 4.4 G/DL (ref 3.5–5.2)
ALBUMIN/GLOB SERPL: 1.5 G/DL
ALP SERPL-CCNC: 100 U/L (ref 39–117)
ALT SERPL W P-5'-P-CCNC: 22 U/L (ref 1–33)
ANION GAP SERPL CALCULATED.3IONS-SCNC: 9 MMOL/L (ref 5–15)
AST SERPL-CCNC: 20 U/L (ref 1–32)
BILIRUB SERPL-MCNC: 0.3 MG/DL (ref 0–1.2)
BUN SERPL-MCNC: 10 MG/DL (ref 6–20)
BUN/CREAT SERPL: 12.8 (ref 7–25)
CALCIUM SPEC-SCNC: 9.4 MG/DL (ref 8.6–10.5)
CHLORIDE SERPL-SCNC: 103 MMOL/L (ref 98–107)
CHOLEST SERPL-MCNC: 216 MG/DL (ref 0–200)
CHROMATIN AB SERPL-ACNC: <10 IU/ML (ref 0–14)
CO2 SERPL-SCNC: 23 MMOL/L (ref 22–29)
CREAT SERPL-MCNC: 0.78 MG/DL (ref 0.57–1)
EGFRCR SERPLBLD CKD-EPI 2021: 109.6 ML/MIN/1.73
GLOBULIN UR ELPH-MCNC: 2.9 GM/DL
GLUCOSE SERPL-MCNC: 101 MG/DL (ref 65–99)
HDLC SERPL-MCNC: 40 MG/DL (ref 40–60)
IGA1 MFR SER: 240 MG/DL (ref 70–400)
IGG1 SER-MCNC: 1213 MG/DL (ref 700–1600)
IGM SERPL-MCNC: 86 MG/DL (ref 40–230)
IRON 24H UR-MRATE: 89 MCG/DL (ref 37–145)
IRON SATN MFR SERPL: 22 % (ref 20–50)
LDLC SERPL CALC-MCNC: 147 MG/DL (ref 0–100)
LDLC/HDLC SERPL: 3.61 {RATIO}
POTASSIUM SERPL-SCNC: 3.7 MMOL/L (ref 3.5–5.2)
PROT SERPL-MCNC: 7.3 G/DL (ref 6–8.5)
SODIUM SERPL-SCNC: 135 MMOL/L (ref 136–145)
T3 SERPL-MCNC: 181 NG/DL (ref 80–200)
T3FREE SERPL-MCNC: 3.51 PG/ML (ref 2–4.4)
T4 FREE SERPL-MCNC: 1.17 NG/DL (ref 0.92–1.68)
T4 SERPL-MCNC: 9.86 MCG/DL (ref 4.5–11.7)
TIBC SERPL-MCNC: 399 MCG/DL (ref 298–536)
TRANSFERRIN SERPL-MCNC: 268 MG/DL (ref 200–360)
TRIGL SERPL-MCNC: 158 MG/DL (ref 0–150)
TSH SERPL DL<=0.05 MIU/L-ACNC: 1.48 UIU/ML (ref 0.27–4.2)
VLDLC SERPL-MCNC: 29 MG/DL (ref 5–40)

## 2024-08-26 LAB
A AB TITR SERPL: NORMAL {TITER}
ABO GROUP BLD: NORMAL
ANA SER QL: NEGATIVE
B AB TITR SERPL: NORMAL {TITER}
BLD GP AB SCN SERPL DONR QL: NEGATIVE
CH50 SERPL-ACNC: >60 U/ML
THYROGLOB AB SERPL-ACNC: 1.7 IU/ML (ref 0–0.9)
THYROPEROXIDASE AB SERPL-ACNC: 146 IU/ML (ref 0–34)

## 2024-08-29 LAB — IGE SERPL-ACNC: 41 IU/ML (ref 6–495)

## 2024-09-18 ENCOUNTER — HOSPITAL ENCOUNTER (OUTPATIENT)
Dept: ULTRASOUND IMAGING | Facility: HOSPITAL | Age: 24
Discharge: HOME | End: 2024-09-18
Admitting: NURSE PRACTITIONER
Payer: MEDICAID

## 2024-09-18 ENCOUNTER — TRANSCRIBE ORDERS (OUTPATIENT)
Dept: ULTRASOUND IMAGING | Facility: HOSPITAL | Age: 24
End: 2024-09-18
Payer: MEDICAID

## 2024-09-18 DIAGNOSIS — R22.1 MASS PRESENT ON ONE SIDE OF NECK: Primary | ICD-10-CM

## 2024-09-18 DIAGNOSIS — R22.1 MASS PRESENT ON ONE SIDE OF NECK: ICD-10-CM

## 2024-09-18 PROCEDURE — 76536 US EXAM OF HEAD AND NECK: CPT

## 2024-09-19 ENCOUNTER — TRANSCRIBE ORDERS (OUTPATIENT)
Dept: ULTRASOUND IMAGING | Facility: HOSPITAL | Age: 24
End: 2024-09-19
Payer: MEDICAID

## 2024-10-07 ENCOUNTER — LAB (OUTPATIENT)
Dept: LAB | Facility: HOSPITAL | Age: 24
End: 2024-10-07
Payer: MEDICAID

## 2024-10-07 ENCOUNTER — TRANSCRIBE ORDERS (OUTPATIENT)
Dept: GENERAL RADIOLOGY | Facility: HOSPITAL | Age: 24
End: 2024-10-07
Payer: MEDICAID

## 2024-10-07 DIAGNOSIS — R07.9 CHEST PAIN, UNSPECIFIED TYPE: Primary | ICD-10-CM

## 2024-10-07 DIAGNOSIS — R07.9 CHEST PAIN, UNSPECIFIED TYPE: ICD-10-CM

## 2024-10-07 LAB — D DIMER PPP FEU-MCNC: 0.32 MCGFEU/ML (ref 0–0.5)

## 2024-10-07 PROCEDURE — 85379 FIBRIN DEGRADATION QUANT: CPT

## 2024-10-07 PROCEDURE — 36415 COLL VENOUS BLD VENIPUNCTURE: CPT

## 2024-10-08 ENCOUNTER — TRANSCRIBE ORDERS (OUTPATIENT)
Dept: GENERAL RADIOLOGY | Facility: HOSPITAL | Age: 24
End: 2024-10-08
Payer: MEDICAID

## 2024-10-08 ENCOUNTER — TRANSCRIBE ORDERS (OUTPATIENT)
Dept: LAB | Facility: HOSPITAL | Age: 24
End: 2024-10-08
Payer: MEDICAID

## 2024-10-08 DIAGNOSIS — R07.9 CHEST PAIN, UNSPECIFIED TYPE: Primary | ICD-10-CM

## 2024-10-09 ENCOUNTER — HOSPITAL ENCOUNTER (OUTPATIENT)
Dept: GENERAL RADIOLOGY | Facility: HOSPITAL | Age: 24
Discharge: HOME | End: 2024-10-09
Admitting: NURSE PRACTITIONER
Payer: MEDICAID

## 2024-10-09 DIAGNOSIS — R07.9 CHEST PAIN, UNSPECIFIED TYPE: ICD-10-CM

## 2024-10-09 PROCEDURE — 71046 X-RAY EXAM CHEST 2 VIEWS: CPT

## 2025-01-17 ENCOUNTER — APPOINTMENT (OUTPATIENT)
Facility: HOSPITAL | Age: 25
End: 2025-01-17
Payer: MEDICAID

## 2025-01-17 ENCOUNTER — HOSPITAL ENCOUNTER (EMERGENCY)
Facility: HOSPITAL | Age: 25
Discharge: HOME | End: 2025-01-17
Attending: STUDENT IN AN ORGANIZED HEALTH CARE EDUCATION/TRAINING PROGRAM
Payer: MEDICAID

## 2025-01-17 VITALS
OXYGEN SATURATION: 100 % | HEART RATE: 69 BPM | TEMPERATURE: 98.3 F | SYSTOLIC BLOOD PRESSURE: 115 MMHG | HEIGHT: 69 IN | BODY MASS INDEX: 43.4 KG/M2 | RESPIRATION RATE: 18 BRPM | WEIGHT: 293 LBS | DIASTOLIC BLOOD PRESSURE: 56 MMHG

## 2025-01-17 DIAGNOSIS — G43.009 MIGRAINE WITHOUT AURA AND WITHOUT STATUS MIGRAINOSUS, NOT INTRACTABLE: Primary | ICD-10-CM

## 2025-01-17 PROCEDURE — 25010000002 DIPHENHYDRAMINE PER 50 MG: Performed by: PHYSICIAN ASSISTANT

## 2025-01-17 PROCEDURE — 25010000002 KETOROLAC TROMETHAMINE PER 15 MG: Performed by: PHYSICIAN ASSISTANT

## 2025-01-17 PROCEDURE — 25810000003 SODIUM CHLORIDE 0.9 % SOLUTION: Performed by: PHYSICIAN ASSISTANT

## 2025-01-17 PROCEDURE — 70450 CT HEAD/BRAIN W/O DYE: CPT

## 2025-01-17 PROCEDURE — 25010000002 METOCLOPRAMIDE PER 10 MG: Performed by: PHYSICIAN ASSISTANT

## 2025-01-17 PROCEDURE — 99284 EMERGENCY DEPT VISIT MOD MDM: CPT

## 2025-01-17 PROCEDURE — 96375 TX/PRO/DX INJ NEW DRUG ADDON: CPT

## 2025-01-17 PROCEDURE — 96374 THER/PROPH/DIAG INJ IV PUSH: CPT

## 2025-01-17 RX ORDER — SODIUM CHLORIDE 0.9 % (FLUSH) 0.9 %
10 SYRINGE (ML) INJECTION AS NEEDED
Status: DISCONTINUED | OUTPATIENT
Start: 2025-01-17 | End: 2025-01-17 | Stop reason: HOSPADM

## 2025-01-17 RX ORDER — ESTRADIOL 0.5 MG/1
0.5 TABLET ORAL DAILY
COMMUNITY

## 2025-01-17 RX ORDER — KETOROLAC TROMETHAMINE 15 MG/ML
15 INJECTION, SOLUTION INTRAMUSCULAR; INTRAVENOUS ONCE
Status: COMPLETED | OUTPATIENT
Start: 2025-01-17 | End: 2025-01-17

## 2025-01-17 RX ORDER — DIPHENHYDRAMINE HYDROCHLORIDE 50 MG/ML
25 INJECTION INTRAMUSCULAR; INTRAVENOUS ONCE
Status: COMPLETED | OUTPATIENT
Start: 2025-01-17 | End: 2025-01-17

## 2025-01-17 RX ORDER — METOCLOPRAMIDE HYDROCHLORIDE 5 MG/ML
10 INJECTION INTRAMUSCULAR; INTRAVENOUS ONCE
Status: COMPLETED | OUTPATIENT
Start: 2025-01-17 | End: 2025-01-17

## 2025-01-17 RX ADMIN — KETOROLAC TROMETHAMINE 15 MG: 15 INJECTION, SOLUTION INTRAMUSCULAR; INTRAVENOUS at 11:43

## 2025-01-17 RX ADMIN — SODIUM CHLORIDE 1000 ML: 9 INJECTION, SOLUTION INTRAVENOUS at 11:27

## 2025-01-17 RX ADMIN — METOCLOPRAMIDE 10 MG: 5 INJECTION, SOLUTION INTRAMUSCULAR; INTRAVENOUS at 11:27

## 2025-01-17 RX ADMIN — DIPHENHYDRAMINE HYDROCHLORIDE 25 MG: 50 INJECTION INTRAMUSCULAR; INTRAVENOUS at 11:27

## 2025-01-17 NOTE — DISCHARGE INSTRUCTIONS
Please follow-up with your primary care provider for further evaluation.  Return to emergency department for any worsening symptoms

## 2025-01-17 NOTE — FSED PROVIDER NOTE
Subjective  History of Present Illness:    This is a 24 year old female who presents with complaints of migraine headache that started this morning.  Patient states that she has a history of migraines but has not had one in quite some time and this is worse than usual.  She states that typically her migraines are one-sided with no associated blurry vision however with this headache she is having some blurry vision and seeing spots out of both eyes.  Patient has nausea without vomiting.  No extremity weakness, no slurred speech.  She did take Excedrin prior to arrival without much relief of her symptoms.  She states that typically Excedrin does improve her symptoms.  She has never seen neurology, has had no imaging of her brain.     Nurses Notes reviewed and agree, including vitals, allergies, social history and prior medical history.     REVIEW OF SYSTEMS: All systems reviewed and not pertinent unless noted.  Review of Systems    Past Medical History:   Diagnosis Date    Asthma     Bipolar 1 disorder     Depression        Allergies:    Patient has no known allergies.      Past Surgical History:   Procedure Laterality Date    CYST REMOVAL Left     ovary    MOUTH SURGERY      RIGHT OOPHORECTOMY      TYMPANOSTOMY TUBE PLACEMENT           Social History     Socioeconomic History    Marital status: Single   Tobacco Use    Smoking status: Never    Smokeless tobacco: Never   Vaping Use    Vaping status: Every Day    Substances: Nicotine, Flavoring    Devices: Refillable tank   Substance and Sexual Activity    Alcohol use: No    Drug use: No    Sexual activity: Never     Birth control/protection: None         Family History   Problem Relation Age of Onset    Migraines Mother     Mental illness Mother     Coronary artery disease Father     Migraines Sister     Bipolar disorder Brother     Hypertension Maternal Grandmother     Diabetes Maternal Grandmother     Coronary artery disease Maternal Grandmother     Cancer Maternal  "Grandfather     Lung cancer Maternal Grandfather     Hypertension Maternal Grandfather     Cancer Paternal Grandmother     Bone cancer Paternal Grandmother     Colon cancer Paternal Grandmother     Coronary artery disease Paternal Grandfather     Clotting disorder Paternal Grandfather     Coronary artery disease Other     Breast cancer Other     Bipolar disorder Half-Sister        Objective  Physical Exam:  /78   Pulse 87   Temp 98.3 °F (36.8 °C) (Oral)   Resp 18   Ht 175.3 cm (69\")   Wt (!) 143 kg (315 lb 8 oz)   LMP 11/04/2024   SpO2 100%   BMI 46.59 kg/m²      Physical Exam  Vitals and nursing note reviewed.   Constitutional:       Appearance: Normal appearance.   Eyes:      Extraocular Movements: Extraocular movements intact.      Pupils: Pupils are equal, round, and reactive to light.   Cardiovascular:      Rate and Rhythm: Normal rate and regular rhythm.      Pulses: Normal pulses.      Heart sounds: Normal heart sounds.   Pulmonary:      Effort: Pulmonary effort is normal.      Breath sounds: Normal breath sounds.   Abdominal:      General: Abdomen is flat.   Musculoskeletal:         General: Normal range of motion.   Skin:     General: Skin is warm.   Neurological:      General: No focal deficit present.      Mental Status: She is oriented to person, place, and time.      Cranial Nerves: No cranial nerve deficit.      Sensory: No sensory deficit.      Motor: No weakness.      Coordination: Coordination normal.      Gait: Gait normal.         Procedures    ED Course:         Lab Results (last 24 hours)       ** No results found for the last 24 hours. **             CT Head Without Contrast    Result Date: 1/17/2025  CT HEAD WO CONTRAST Date of Exam: 1/17/2025 11:16 AM EST Indication: headache, blurry vision. Comparison: None available. Technique: Axial CT images were obtained of the head without contrast administration.  Automated exposure control and iterative construction methods were used. " Findings: The calvarium appears intact. Included paranasal sinuses and mastoids appear clear except for right and left maxillary sinus mucous cysts. No gross abnormalities are seen of the orbits. The brain appears within normal limits. There is no evidence of edema/infarct, intracranial hemorrhage, mass or mass effect, hydrocephalus, or abnormal extra-axial collection. No unusual focal or generalized encephalomalacia is seen.     Impression: Impression: No evidence of acute intracranial disease. Electronically Signed: Gage Cruz MD  1/17/2025 11:29 AM EST  Workstation ID: [id number]        [facility]      Initial impression of presenting illness: This is a 24-year-old female who presents with complaints of migraine headache.  Patient given migraine cocktail with improvement of her headache.  She will follow-up with primary care as needed.  Discussed return precautions.  Discussed findings and plan of care with the patient is agreeable to discharge    DDX: includes but is not limited to: Migraine headache, intracranial hemorrhage, tension headache        Medications   sodium chloride 0.9 % flush 10 mL (has no administration in time range)   metoclopramide (REGLAN) injection 10 mg (10 mg Intravenous Given 1/17/25 1127)   diphenhydrAMINE (BENADRYL) injection 25 mg (25 mg Intravenous Given 1/17/25 1127)   sodium chloride 0.9 % bolus 1,000 mL (1,000 mL Intravenous New Bag 1/17/25 1127)   ketorolac (TORADOL) injection 15 mg (15 mg Intravenous Given 1/17/25 1143)       Data interpreted: Nursing notes reviewed, vital signs reviewed.  Labs independently interpreted by me (CBC, CMP, lipase, UA, troponin, ABG, lactic acid, procalcitonin).  Imaging independently interpreted by me (x-ray, CT scan).  EKG independently interpreted by me.  O2 saturation:    Counseling: Discussed the results above with the patient regarding need for admission or discharge.  Patient understands and agrees plan of care.      -----  ED Disposition       ED  Disposition   Discharge    Condition   Stable    Comment   --             Final diagnoses:   Migraine without aura and without status migrainosus, not intractable      Your Follow-Up Providers       Go to  [facility] EMERGENCY DEPARTMENT [place].    Specialty: Emergency Medicine  Follow up details: As needed, If symptoms worsen  [address]  [facility] [address] [phone]             Belem West APRN In 1 week.    Specialty: Nurse Practitioner  Follow up details: As needed, If symptoms worsen  [address]   [place] [address] [phone]                       Contact information for after-discharge care    Follow-up information has not been specified.                    Your medication list        CONTINUE taking these medications        Instructions Last Dose Given Next Dose Due   BD ULTRA-FINE PEN NEEDLES 29G X 12.7MM misc  Generic drug: Insulin Pen Needle           clotrimazole-betamethasone 1-0.05 % cream  Commonly known as: LOTRISONE      Apply 1 Application topically to the appropriate area as directed 2 (Two) Times a Day.       desvenlafaxine 50 MG 24 hr tablet  Commonly known as: PRISTIQ      Take 1 tablet by mouth Daily.       estradiol 0.5 MG tablet  Commonly known as: ESTRACE      Take 1 tablet by mouth Daily.       HYDROcodone-acetaminophen 5-325 MG per tablet  Commonly known as: NORCO      Take 1 tablet by mouth Every 6 (Six) Hours As Needed for Severe Pain.       Iron (Ferrous Sulfate) 325 (65 Fe) MG tablet      Take 325 mg by mouth Daily.       ketorolac 10 MG tablet  Commonly known as: TORADOL      Take 1 tablet by mouth Every 6 (Six) Hours As Needed for Moderate Pain.       lamoTRIgine 25 MG tablet  Commonly known as: LaMICtal      Take 3 tablets by mouth Daily.       ondansetron ODT 4 MG disintegrating tablet  Commonly known as: ZOFRAN-ODT      Place 1 tablet under the tongue Every 6 (Six) Hours As Needed for Vomiting or Nausea.       Victoza  18 MG/3ML solution pen-injector injection  Generic drug: Liraglutide      Inject 1.2 mg under the skin into the appropriate area as directed Daily.       vitamin B-12 1000 MCG tablet  Commonly known as: CYANOCOBALAMIN      Take 1 tablet by mouth Daily.       vitamin D 1.25 MG (07512 UT) capsule capsule  Commonly known as: ERGOCALCIFEROL      Take 1 capsule by mouth Every 7 (Seven) Days.

## 2025-02-24 ENCOUNTER — HOSPITAL ENCOUNTER (EMERGENCY)
Facility: HOSPITAL | Age: 25
Discharge: HOME | End: 2025-02-24
Attending: EMERGENCY MEDICINE
Payer: MEDICAID

## 2025-02-24 VITALS
SYSTOLIC BLOOD PRESSURE: 144 MMHG | HEART RATE: 67 BPM | WEIGHT: 293 LBS | DIASTOLIC BLOOD PRESSURE: 74 MMHG | OXYGEN SATURATION: 100 % | RESPIRATION RATE: 22 BRPM | BODY MASS INDEX: 43.4 KG/M2 | TEMPERATURE: 97.5 F | HEIGHT: 69 IN

## 2025-02-24 DIAGNOSIS — G44.039 EPISODIC PAROXYSMAL HEMICRANIA, NOT INTRACTABLE: Primary | ICD-10-CM

## 2025-02-24 PROCEDURE — 96375 TX/PRO/DX INJ NEW DRUG ADDON: CPT

## 2025-02-24 PROCEDURE — 25010000002 DIPHENHYDRAMINE PER 50 MG: Performed by: EMERGENCY MEDICINE

## 2025-02-24 PROCEDURE — 25010000002 KETOROLAC TROMETHAMINE PER 15 MG: Performed by: EMERGENCY MEDICINE

## 2025-02-24 PROCEDURE — 99282 EMERGENCY DEPT VISIT SF MDM: CPT

## 2025-02-24 PROCEDURE — 25810000003 SODIUM CHLORIDE 0.9 % SOLUTION: Performed by: EMERGENCY MEDICINE

## 2025-02-24 PROCEDURE — 96374 THER/PROPH/DIAG INJ IV PUSH: CPT

## 2025-02-24 PROCEDURE — 25010000002 METOCLOPRAMIDE PER 10 MG: Performed by: EMERGENCY MEDICINE

## 2025-02-24 RX ORDER — KETOROLAC TROMETHAMINE 15 MG/ML
15 INJECTION, SOLUTION INTRAMUSCULAR; INTRAVENOUS ONCE
Status: COMPLETED | OUTPATIENT
Start: 2025-02-24 | End: 2025-02-24

## 2025-02-24 RX ORDER — METOCLOPRAMIDE HYDROCHLORIDE 5 MG/ML
10 INJECTION INTRAMUSCULAR; INTRAVENOUS ONCE
Status: COMPLETED | OUTPATIENT
Start: 2025-02-24 | End: 2025-02-24

## 2025-02-24 RX ORDER — DIPHENHYDRAMINE HYDROCHLORIDE 50 MG/ML
25 INJECTION INTRAMUSCULAR; INTRAVENOUS ONCE
Status: COMPLETED | OUTPATIENT
Start: 2025-02-24 | End: 2025-02-24

## 2025-02-24 RX ADMIN — KETOROLAC TROMETHAMINE 15 MG: 15 INJECTION, SOLUTION INTRAMUSCULAR; INTRAVENOUS at 08:55

## 2025-02-24 RX ADMIN — SODIUM CHLORIDE 1000 ML: 9 INJECTION, SOLUTION INTRAVENOUS at 08:54

## 2025-02-24 RX ADMIN — DIPHENHYDRAMINE HYDROCHLORIDE 25 MG: 50 INJECTION INTRAMUSCULAR; INTRAVENOUS at 08:56

## 2025-02-24 RX ADMIN — METOCLOPRAMIDE 10 MG: 5 INJECTION, SOLUTION INTRAMUSCULAR; INTRAVENOUS at 08:56

## 2025-02-24 NOTE — Clinical Note
[facility] EMERGENCY DEPARTMENT [place]  [address]  [facility] [address]  Phone: [phone]  Fax: [phone]    Kylah Ruiz was seen and treated in our emergency department on 2/24/2025.  She may return to work on 02/25/2025.         Thank you for choosing [facility].    Chad, Iain HOFFMANN MD

## 2025-02-24 NOTE — FSED PROVIDER NOTE
Subjective  History of Present Illness:    Pt presents with a right-sided headache (behind eye and top of the head).  Pt reports that the pain is aggravated by loud noises and lights.  Pt denies vision changes. Pt reports she has not tried any medications.  Pt reports nausea. Pt states this is exactly the same as prior migraines.      Nurses Notes reviewed and agree, including vitals, allergies, social history and prior medical history.     REVIEW OF SYSTEMS:   Review of Systems   Neurological:  Positive for headaches.       Past Medical History:   Diagnosis Date    Asthma     Bipolar 1 disorder     Depression        Allergies:    Patient has no known allergies.      Past Surgical History:   Procedure Laterality Date    CYST REMOVAL Left     ovary    MOUTH SURGERY      RIGHT OOPHORECTOMY      TYMPANOSTOMY TUBE PLACEMENT           Social History     Socioeconomic History    Marital status: Single   Tobacco Use    Smoking status: Never    Smokeless tobacco: Never   Vaping Use    Vaping status: Every Day    Substances: Nicotine, Flavoring    Devices: Refillable tank   Substance and Sexual Activity    Alcohol use: No    Drug use: No    Sexual activity: Never     Birth control/protection: None         Family History   Problem Relation Age of Onset    Migraines Mother     Mental illness Mother     Coronary artery disease Father     Migraines Sister     Bipolar disorder Brother     Hypertension Maternal Grandmother     Diabetes Maternal Grandmother     Coronary artery disease Maternal Grandmother     Cancer Maternal Grandfather     Lung cancer Maternal Grandfather     Hypertension Maternal Grandfather     Cancer Paternal Grandmother     Bone cancer Paternal Grandmother     Colon cancer Paternal Grandmother     Coronary artery disease Paternal Grandfather     Clotting disorder Paternal Grandfather     Coronary artery disease Other     Breast cancer Other     Bipolar disorder Half-Sister        Objective  Physical Exam:  BP  "141/81 (BP Location: Left arm, Patient Position: Sitting)   Pulse 80   Temp 97.5 °F (36.4 °C) (Oral)   Resp 22   Ht 175.3 cm (69\")   Wt (!) 147 kg (323 lb 14.4 oz)   LMP  (LMP Unknown)   SpO2 99%   BMI 47.83 kg/m²      Physical Exam  Vitals and nursing note reviewed.   Constitutional:       Appearance: Normal appearance.   HENT:      Right Ear: External ear normal.      Left Ear: External ear normal.      Nose: Nose normal.      Mouth/Throat:      Mouth: Mucous membranes are moist.   Eyes:      Extraocular Movements: Extraocular movements intact.   Cardiovascular:      Rate and Rhythm: Normal rate and regular rhythm.   Pulmonary:      Effort: Pulmonary effort is normal.      Breath sounds: Normal breath sounds.   Musculoskeletal:         General: Normal range of motion.   Skin:     General: Skin is warm and dry.   Neurological:      General: No focal deficit present.      Mental Status: She is alert and oriented to person, place, and time.      Cranial Nerves: No cranial nerve deficit.      Sensory: No sensory deficit.      Motor: No weakness.   Psychiatric:         Mood and Affect: Mood normal.         Behavior: Behavior normal.         Thought Content: Thought content normal.         Procedures    ED Course:         Lab Results (last 24 hours)       ** No results found for the last 24 hours. **             No radiology results from the last 24 hrs       MDM      Initial impression of presenting illness: ***    DDX: includes but is not limited to: ***    Patient arrives *** with vitals interpreted by myself.     Pertinent features from physical exam: ***.    Initial diagnostic plan: ***    Results from initial plan were reviewed and interpreted by me revealing ***    Diagnostic information from other sources: ***    Interventions / Re-evaluation: ***    Medications   sodium chloride 0.9 % bolus 1,000 mL (1,000 mL Intravenous New Bag 2/24/25 0854)   metoclopramide (REGLAN) injection 10 mg (10 mg Intravenous " "Given 2/24/25 0856)   diphenhydrAMINE (BENADRYL) injection 25 mg (25 mg Intravenous Given 2/24/25 0856)   ketorolac (TORADOL) injection 15 mg (15 mg Intravenous Given 2/24/25 0855)       Results/clinical rationale were discussed with {Relatives of adult:70809::\"patient\"}    Consultations/Discussion of results with other physicians: ***    Data interpreted: Nursing notes reviewed, vital signs reviewed.  {labs :65907} {imagingED:04523} reviewed independently interpreted by me.  EKG independently interpreted by me.  O2 saturation:    Care significantly affected by Social Determinants of Health (housing and economic circumstances, unemployment)               [] Yes     [x] No              If yes, Patient's care significantly limited by  Social Determinants of Health including:              [] Inadequate housing              [] Low income              [] Alcoholism and drug addiction in family              [] Problems related to primary support group              [] Unemployment              [] Problems related to employment              [] Other Social Determinants of Health:     Counseling: Discussed the results above with the patient regarding need for {DispED:59674}.  Patient understands and agrees plan of care.      -----  ED Disposition       None          Final diagnoses:   None     Your Follow-Up Providers    Follow-up information has not been specified.       Contact information for after-discharge care    Follow-up information has not been specified.          Your medication list        CONTINUE taking these medications        Instructions Last Dose Given Next Dose Due   BD ULTRA-FINE PEN NEEDLES 29G X 12.7MM misc  Generic drug: Insulin Pen Needle                  ASK your doctor about these medications        Instructions Last Dose Given Next Dose Due   clotrimazole-betamethasone 1-0.05 % cream  Commonly known as: LOTRISONE      Apply 1 Application topically to the appropriate area as directed 2 (Two) Times a " Day.       desvenlafaxine 50 MG 24 hr tablet  Commonly known as: PRISTIQ      Take 1 tablet by mouth Daily.       estradiol 0.5 MG tablet  Commonly known as: ESTRACE      Take 1 tablet by mouth Daily.       HYDROcodone-acetaminophen 5-325 MG per tablet  Commonly known as: NORCO      Take 1 tablet by mouth Every 6 (Six) Hours As Needed for Severe Pain.       Iron (Ferrous Sulfate) 325 (65 Fe) MG tablet      Take 325 mg by mouth Daily.       ketorolac 10 MG tablet  Commonly known as: TORADOL      Take 1 tablet by mouth Every 6 (Six) Hours As Needed for Moderate Pain.       lamoTRIgine 25 MG tablet  Commonly known as: LaMICtal      Take 3 tablets by mouth Daily.       ondansetron ODT 4 MG disintegrating tablet  Commonly known as: ZOFRAN-ODT      Place 1 tablet under the tongue Every 6 (Six) Hours As Needed for Vomiting or Nausea.       Victoza 18 MG/3ML solution pen-injector injection  Generic drug: Liraglutide      Inject 1.2 mg under the skin into the appropriate area as directed Daily.       vitamin B-12 1000 MCG tablet  Commonly known as: CYANOCOBALAMIN      Take 1 tablet by mouth Daily.       vitamin D 1.25 MG (76077 UT) capsule capsule  Commonly known as: ERGOCALCIFEROL      Take 1 capsule by mouth Every 7 (Seven) Days.

## 2025-02-24 NOTE — Clinical Note
[facility] EMERGENCY DEPARTMENT [place]  [address]  [facility] [address]  Phone: [phone]  Fax: [phone]    Kylah Ruiz was seen and treated in our emergency department on 2/24/2025.  She may return to work on 02/25/2025.         Thank you for choosing [facility].    Chad, Iain HOFFMANN MD

## 2025-04-01 ENCOUNTER — APPOINTMENT (OUTPATIENT)
Dept: CT IMAGING | Facility: HOSPITAL | Age: 25
End: 2025-04-01
Payer: MEDICAID

## 2025-04-01 ENCOUNTER — HOSPITAL ENCOUNTER (EMERGENCY)
Facility: HOSPITAL | Age: 25
Discharge: HOME | End: 2025-04-01
Attending: STUDENT IN AN ORGANIZED HEALTH CARE EDUCATION/TRAINING PROGRAM | Admitting: STUDENT IN AN ORGANIZED HEALTH CARE EDUCATION/TRAINING PROGRAM
Payer: MEDICAID

## 2025-04-01 VITALS
HEART RATE: 94 BPM | WEIGHT: 293 LBS | DIASTOLIC BLOOD PRESSURE: 76 MMHG | OXYGEN SATURATION: 97 % | HEIGHT: 69 IN | SYSTOLIC BLOOD PRESSURE: 144 MMHG | RESPIRATION RATE: 20 BRPM | TEMPERATURE: 97.7 F | BODY MASS INDEX: 43.4 KG/M2

## 2025-04-01 DIAGNOSIS — G43.909 MIGRAINE WITHOUT STATUS MIGRAINOSUS, NOT INTRACTABLE, UNSPECIFIED MIGRAINE TYPE: Primary | ICD-10-CM

## 2025-04-01 LAB
ANION GAP SERPL CALCULATED.3IONS-SCNC: 9.9 MMOL/L (ref 5–15)
BASOPHILS # BLD AUTO: 0.02 10*3/MM3 (ref 0–0.2)
BASOPHILS NFR BLD AUTO: 0.4 % (ref 0–1.5)
BUN SERPL-MCNC: 12 MG/DL (ref 6–20)
BUN/CREAT SERPL: 17.6 (ref 7–25)
CALCIUM SPEC-SCNC: 9 MG/DL (ref 8.6–10.5)
CHLORIDE SERPL-SCNC: 103 MMOL/L (ref 98–107)
CO2 SERPL-SCNC: 24.1 MMOL/L (ref 22–29)
CREAT SERPL-MCNC: 0.68 MG/DL (ref 0.57–1)
DEPRECATED RDW RBC AUTO: 41.6 FL (ref 37–54)
EGFRCR SERPLBLD CKD-EPI 2021: 124.9 ML/MIN/1.73
EOSINOPHIL # BLD AUTO: 0.13 10*3/MM3 (ref 0–0.4)
EOSINOPHIL NFR BLD AUTO: 2.6 % (ref 0.3–6.2)
ERYTHROCYTE [DISTWIDTH] IN BLOOD BY AUTOMATED COUNT: 13.8 % (ref 12.3–15.4)
GLUCOSE SERPL-MCNC: 86 MG/DL (ref 65–99)
HCG SERPL QL: NEGATIVE
HCT VFR BLD AUTO: 39.9 % (ref 34–46.6)
HGB BLD-MCNC: 13.2 G/DL (ref 12–15.9)
IMM GRANULOCYTES # BLD AUTO: 0.02 10*3/MM3 (ref 0–0.05)
IMM GRANULOCYTES NFR BLD AUTO: 0.4 % (ref 0–0.5)
LYMPHOCYTES # BLD AUTO: 2.32 10*3/MM3 (ref 0.7–3.1)
LYMPHOCYTES NFR BLD AUTO: 46.3 % (ref 19.6–45.3)
MCH RBC QN AUTO: 27.3 PG (ref 26.6–33)
MCHC RBC AUTO-ENTMCNC: 33.1 G/DL (ref 31.5–35.7)
MCV RBC AUTO: 82.6 FL (ref 79–97)
MONOCYTES # BLD AUTO: 0.29 10*3/MM3 (ref 0.1–0.9)
MONOCYTES NFR BLD AUTO: 5.8 % (ref 5–12)
NEUTROPHILS NFR BLD AUTO: 2.23 10*3/MM3 (ref 1.7–7)
NEUTROPHILS NFR BLD AUTO: 44.5 % (ref 42.7–76)
NRBC BLD AUTO-RTO: 0 /100 WBC (ref 0–0.2)
PLATELET # BLD AUTO: 281 10*3/MM3 (ref 140–450)
PMV BLD AUTO: 10.4 FL (ref 6–12)
POTASSIUM SERPL-SCNC: 4.1 MMOL/L (ref 3.5–5.2)
RBC # BLD AUTO: 4.83 10*6/MM3 (ref 3.77–5.28)
SODIUM SERPL-SCNC: 137 MMOL/L (ref 136–145)
WBC NRBC COR # BLD AUTO: 5.01 10*3/MM3 (ref 3.4–10.8)

## 2025-04-01 PROCEDURE — 99285 EMERGENCY DEPT VISIT HI MDM: CPT | Performed by: STUDENT IN AN ORGANIZED HEALTH CARE EDUCATION/TRAINING PROGRAM

## 2025-04-01 PROCEDURE — 70450 CT HEAD/BRAIN W/O DYE: CPT

## 2025-04-01 PROCEDURE — 80048 BASIC METABOLIC PNL TOTAL CA: CPT

## 2025-04-01 PROCEDURE — 70498 CT ANGIOGRAPHY NECK: CPT

## 2025-04-01 PROCEDURE — 96374 THER/PROPH/DIAG INJ IV PUSH: CPT

## 2025-04-01 PROCEDURE — 70496 CT ANGIOGRAPHY HEAD: CPT

## 2025-04-01 PROCEDURE — 84703 CHORIONIC GONADOTROPIN ASSAY: CPT

## 2025-04-01 PROCEDURE — 25010000002 PROCHLORPERAZINE 10 MG/2ML SOLUTION

## 2025-04-01 PROCEDURE — 85025 COMPLETE CBC W/AUTO DIFF WBC: CPT

## 2025-04-01 PROCEDURE — 25510000001 IOPAMIDOL 61 % SOLUTION: Performed by: STUDENT IN AN ORGANIZED HEALTH CARE EDUCATION/TRAINING PROGRAM

## 2025-04-01 RX ORDER — SODIUM CHLORIDE 0.9 % (FLUSH) 0.9 %
10 SYRINGE (ML) INJECTION AS NEEDED
Status: DISCONTINUED | OUTPATIENT
Start: 2025-04-01 | End: 2025-04-01 | Stop reason: HOSPADM

## 2025-04-01 RX ORDER — IOPAMIDOL 612 MG/ML
100 INJECTION, SOLUTION INTRAVASCULAR
Status: COMPLETED | OUTPATIENT
Start: 2025-04-01 | End: 2025-04-01

## 2025-04-01 RX ORDER — PROCHLORPERAZINE EDISYLATE 5 MG/ML
10 INJECTION INTRAMUSCULAR; INTRAVENOUS ONCE
Status: COMPLETED | OUTPATIENT
Start: 2025-04-01 | End: 2025-04-01

## 2025-04-01 RX ADMIN — PROCHLORPERAZINE EDISYLATE 10 MG: 5 INJECTION INTRAMUSCULAR; INTRAVENOUS at 11:50

## 2025-04-01 RX ADMIN — IOPAMIDOL 100 ML: 612 INJECTION, SOLUTION INTRAVENOUS at 13:01

## 2025-04-01 NOTE — DISCHARGE INSTRUCTIONS
We recommend close follow-up in the next several days with your primary care provider as well as follow-up with neurology for further evaluation of your headaches.  CT scan and CTA head and neck scans were unremarkable today with no evidence of mass, aneurysm, or blockages to arteries.  We suspect migraine headache is the primary cause of your symptoms but further evaluation is necessary to confirm this.  Return to the ER immediately however for any acute changes or worsening of your condition.

## 2025-04-01 NOTE — ED PROVIDER NOTES
EMERGENCY DEPARTMENT ENCOUNTER    Pt Name: Kylah Ruiz  MRN: [id number]  Pt :   2000  Room Number:  02SF/02  Date of encounter:  2025  PCP: Belem West APRN  ED Provider: Ariel Ward PA-C    Historian: patient, nursing notes      HPI:  Chief Complaint: headache        Context: Kylah Ruiz is a 25 y.o. female who presents to the ED c/o headache for the past 48 hours.  Patient states symptoms started on  and have waxed and waned in intensity but starting last night at 9 PM she began having a severe continuous headache, and symptoms of blurry vision with intermittent dizziness.  Patient denies chest pain or shortness of breath, lightheadedness, numbness, extremity weakness, gait abnormalities, or any other complaint today.  Patient does report history of frequent headaches but has not yet followed up with neurology.      PAST MEDICAL HISTORY  Past Medical History:   Diagnosis Date    Asthma     Bipolar 1 disorder     Depression          PAST SURGICAL HISTORY  Past Surgical History:   Procedure Laterality Date    CYST REMOVAL Left     ovary    LEFT OOPHORECTOMY Left 2024    MOUTH SURGERY      RIGHT OOPHORECTOMY      TYMPANOSTOMY TUBE PLACEMENT           FAMILY HISTORY  Family History   Problem Relation Age of Onset    Migraines Mother     Mental illness Mother     Coronary artery disease Father     Migraines Sister     Bipolar disorder Brother     Hypertension Maternal Grandmother     Diabetes Maternal Grandmother     Coronary artery disease Maternal Grandmother     Cancer Maternal Grandfather     Lung cancer Maternal Grandfather     Hypertension Maternal Grandfather     Cancer Paternal Grandmother     Bone cancer Paternal Grandmother     Colon cancer Paternal Grandmother     Coronary artery disease Paternal Grandfather     Clotting disorder Paternal Grandfather     Coronary artery disease Other     Breast cancer Other     Bipolar disorder Half-Sister          SOCIAL  HISTORY  Social History     Socioeconomic History    Marital status: Single   Tobacco Use    Smoking status: Never    Smokeless tobacco: Never   Vaping Use    Vaping status: Every Day    Substances: Nicotine, Flavoring    Devices: Refillable tank   Substance and Sexual Activity    Alcohol use: No    Drug use: No    Sexual activity: Never     Birth control/protection: None         ALLERGIES  Patient has no known allergies.        REVIEW OF SYSTEMS  Review of Systems   Constitutional:  Negative for chills and fever.   HENT:  Negative for congestion and sore throat.    Eyes:  Positive for photophobia and visual disturbance.   Respiratory:  Negative for cough and shortness of breath.    Cardiovascular:  Negative for chest pain.   Gastrointestinal:  Negative for abdominal pain, nausea and vomiting.   Genitourinary:  Negative for dysuria.   Musculoskeletal:  Negative for back pain.   Skin:  Negative for wound.   Neurological:  Positive for headaches. Negative for dizziness, tremors, seizures, syncope, facial asymmetry, speech difficulty, weakness and numbness.   Psychiatric/Behavioral:  Negative for confusion.    All other systems reviewed and are negative.         All systems reviewed and negative except for those discussed in HPI.       PHYSICAL EXAM    I have reviewed the triage vital signs and nursing notes.    ED Triage Vitals [04/01/25 1031]   Temp Heart Rate Resp BP SpO2   97.7 °F (36.5 °C) 94 20 (!) 151/103 100 %      Temp src Heart Rate Source Patient Position BP Location FiO2 (%)   -- -- -- -- --       Physical Exam  Vitals and nursing note reviewed.   Constitutional:       General: She is not in acute distress.     Appearance: She is not ill-appearing, toxic-appearing or diaphoretic.   HENT:      Head: Normocephalic and atraumatic.      Mouth/Throat:      Mouth: Mucous membranes are moist.      Pharynx: Oropharynx is clear.   Eyes:      General: Visual field deficit present.      Extraocular Movements:  Extraocular movements intact.   Cardiovascular:      Rate and Rhythm: Normal rate.      Heart sounds: Normal heart sounds.   Pulmonary:      Effort: Pulmonary effort is normal. No respiratory distress.      Breath sounds: Normal breath sounds.   Abdominal:      Tenderness: There is no abdominal tenderness.   Skin:     General: Skin is warm and dry.      Findings: No rash.   Neurological:      General: No focal deficit present.      Mental Status: She is alert and oriented to person, place, and time.      GCS: GCS eye subscore is 4. GCS verbal subscore is 5. GCS motor subscore is 6.      Cranial Nerves: Cranial nerves 2-12 are intact. No cranial nerve deficit, dysarthria or facial asymmetry.      Sensory: Sensation is intact.      Motor: Motor function is intact.      Coordination: Coordination is intact.      Gait: Gait is intact.             LAB RESULTS  Recent Results (from the past 24 hours)   Basic Metabolic Panel    Collection Time: 04/01/25 11:49 AM    Specimen: Blood   Result Value Ref Range    Glucose 86 65 - 99 mg/dL    BUN 12 6 - 20 mg/dL    Creatinine 0.68 0.57 - 1.00 mg/dL    Sodium 137 136 - 145 mmol/L    Potassium 4.1 3.5 - 5.2 mmol/L    Chloride 103 98 - 107 mmol/L    CO2 24.1 22.0 - 29.0 mmol/L    Calcium 9.0 8.6 - 10.5 mg/dL    BUN/Creatinine Ratio 17.6 7.0 - 25.0    Anion Gap 9.9 5.0 - 15.0 mmol/L    eGFR 124.9 >60.0 mL/min/1.73   hCG, Serum, Qualitative    Collection Time: 04/01/25 11:49 AM    Specimen: Blood   Result Value Ref Range    HCG Qualitative Negative Negative   CBC Auto Differential    Collection Time: 04/01/25 11:49 AM    Specimen: Blood   Result Value Ref Range    WBC 5.01 3.40 - 10.80 10*3/mm3    RBC 4.83 3.77 - 5.28 10*6/mm3    Hemoglobin 13.2 12.0 - 15.9 g/dL    Hematocrit 39.9 34.0 - 46.6 %    MCV 82.6 79.0 - 97.0 fL    MCH 27.3 26.6 - 33.0 pg    MCHC 33.1 31.5 - 35.7 g/dL    RDW 13.8 12.3 - 15.4 %    RDW-SD 41.6 37.0 - 54.0 fl    MPV 10.4 6.0 - 12.0 fL    Platelets 281 140 - 450  10*3/mm3    Neutrophil % 44.5 42.7 - 76.0 %    Lymphocyte % 46.3 (H) 19.6 - 45.3 %    Monocyte % 5.8 5.0 - 12.0 %    Eosinophil % 2.6 0.3 - 6.2 %    Basophil % 0.4 0.0 - 1.5 %    Immature Grans % 0.4 0.0 - 0.5 %    Neutrophils, Absolute 2.23 1.70 - 7.00 10*3/mm3    Lymphocytes, Absolute 2.32 0.70 - 3.10 10*3/mm3    Monocytes, Absolute 0.29 0.10 - 0.90 10*3/mm3    Eosinophils, Absolute 0.13 0.00 - 0.40 10*3/mm3    Basophils, Absolute 0.02 0.00 - 0.20 10*3/mm3    Immature Grans, Absolute 0.02 0.00 - 0.05 10*3/mm3    nRBC 0.0 0.0 - 0.2 /100 WBC       If labs were ordered, I independently reviewed the results and considered them in treating the patient.        RADIOLOGY  CT Angiogram Head  CT Angiogram Head, CT Angiogram Neck  Result Date: 4/1/2025  PROCEDURE: CT ANGIOGRAM HEAD-, CT ANGIOGRAM NECK-  HISTORY: headache, blurry vision, dizziness  TECHNIQUE: Thin section axial CT with IV contrast supplemented with multiplanar reconstruction of the head and neck.  FINDINGS:  CTA:  Aortic arch:  Arch shows no significant narrowing. Great vessel origins are widely patent.  Right carotid:  No significant stenosis is seen of the cervical common or internal carotid artery.  Left carotid:  No significant stenosis is seen of the cervical common or internal carotid artery.  Vertebrals: Codominant vertebral arteries. No significant stenosis is present.  The cranial circulation is  unremarkable.      No evidence of acute large vessel occlusive disease.  DLP: 686.42 mGy.cm CTDI: 37.4 mGy   This study was performed with techniques to keep radiation doses as low as reasonably achievable (ALARA). Individualized dose reduction techniques using automated exposure control or adjustment of mA and/or kV according to the patient size were employed.      Images were reviewed, interpreted, and dictated by Dr. Vikki Murillo MD Transcribed by Anny Villalba PA-C.  This report was signed and finalized on 4/1/2025 1:57 PM by Vikki Murillo MD.      CT  Head Without Contrast  Result Date: 4/1/2025  PROCEDURE: CT HEAD WO CONTRAST-  HISTORY: headache, blury vision, dizziness  COMPARISON:  None .  TECHNIQUE: Multiple axial CT images were performed from the foramen magnum to the vertex without enhancement.  FINDINGS: There is no CT evidence of no acute intracranial hemorrhage. There is no mass, mass effect or midline shift.  There is no hydrocephalus. There is a left maxillary sinus mucous retention cyst or polyp. The remaining paranasal sinuses are clear. Mastoid air cells are clear. Bone windows reveal no acute osseous abnormalities.      No acute intracranial process.     DLP: 686.42 mGy.cm CTDI: 37.4 mGy   This study was performed with techniques to keep radiation doses as low as reasonably achievable (ALARA). Individualized dose reduction techniques using automated exposure control or adjustment of mA and/or kV according to the patient size were employed.     Images were reviewed, interpreted, and dictated by Dr. Vikki Murillo MD Transcribed by Anny Villalba PA-C.  This report was signed and finalized on 4/1/2025 1:53 PM by Vikki Murillo MD.        I ordered and independently reviewed the above noted radiographic studies.      I viewed images of CT head which showed no mass, no bleed per my independent interpretation.    I viewed images of CTA head which showed patent vessels no aneurysms or dissections per my independent interpretation     I viewed images of CTA neck which showed patent vessels with no significant stenosis per my independent interpretation    See radiologist's dictation for official interpretation.        PROCEDURES    Procedures    No orders to display       MEDICATIONS GIVEN IN ER    Medications   sodium chloride 0.9 % flush 10 mL (has no administration in time range)   prochlorperazine (COMPAZINE) injection 10 mg (10 mg Intravenous Given 4/1/25 1150)   iopamidol (ISOVUE-300) 61 % injection 100 mL (100 mL Intravenous Given 4/1/25 1301)          MEDICAL DECISION MAKING, PROGRESS, and CONSULTS    All labs, if obtained, have been independently reviewed by me.  All radiology studies, if obtained, have been reviewed by me and the radiologist dictating the report.  All EKG's, if obtained, have been independently viewed and interpreted by me/my attending physician.      Discussion below represents my analysis of pertinent findings related to patient's condition, differential diagnosis, treatment plan and final disposition.    Patient is a 25-year-old female presenting to ER for several day history of headache blurry vision and photophobia.  Per my chart review patient had a CT head scan noncontrast performed January of this year showing no acute issues.  Given concern for patient's symptoms, CTA head and neck scan was obtained and was unremarkable with no dissections no significant stenosis patent vessels no aneurysms no intracranial hemorrhage.  No mass or tumor noted on scan.  Basic labs unremarkable pregnancy test is negative.  Compazine was administered and when I went to reevaluate the patient after 3 and half hours in the emergency department she states her headache has resolved she is feeling much better and would like to go home.  Repeat neuroexam is benign patient can ambulate normally with no ataxia.  I feel she is stable for discharge and outpatient neurology follow-up as well as PCP evaluation.  Patient verbalized understanding of and agreement with this plan of care.  Neurology referral provided.                       Differential diagnosis:    Differential diagnosis included but was not limited to migraine headache, generalized headache, sinusitis, idiopathic intracranial hypertension, intracranial mass, intracranial hemorrhage, aneurysm      Additional sources:    - Discussed/ obtained information from independent historians: Patient's friend at bedside    - External (non-ED) record review: Previous medical records reviewed including review  of the patient's CT head scan from January 2025 which per radiologist report showed no acute intracranial abnormalities    - Chronic or social conditions impacting care: None    Orders placed during this visit:  Orders Placed This Encounter   Procedures    CT Angiogram Head    CT Angiogram Neck    CT Head Without Contrast    Basic Metabolic Panel    hCG, Serum, Qualitative    CBC Auto Differential    Ambulatory Referral to Neurology    Insert Peripheral IV    CBC & Differential         Additional orders considered but not ordered: None      ED Course:    Consultants: None    ED Course as of 04/01/25 1403   Tue Apr 01, 2025   1149 I have reviewed the mid-level provider(s) note and verbally discussed the case/plan of care.  I was available for consultation as needed at all times during the patient's visit in the Emergency Department.  I agree with the clinical impression, plan, and disposition unless otherwise stated in the MDM below.    ATTENDING ATTESTATION  HPI: 25-year-old female presents with intermittent headache that started gradually on Sunday.  Associated blurred vision and photophobia.  History of previous headaches, but has never been evaluated by neurology.    MDM: ED workup reviewed.    I have reviewed the labs results. Clinically unremarkable.    Radiology reports reviewed.     CT Angiogram Head  Result Date: 4/1/2025  No evidence of acute large vessel occlusive disease.       CT Angiogram Neck  Result Date: 4/1/2025  No evidence of acute large vessel occlusive disease.      CT Head Without Contrast  Result Date: 4/1/2025  No acute intracranial process.       [JS]      ED Course User Index  [JS] Dima Taylor DO              Shared Decision Making:  After my consideration of clinical presentation and any laboratory/radiology studies obtained, I discussed the findings with the patient/patient representative who is in agreement with the treatment plan and the final disposition.   Risks and benefits of  discharge and/or observation/admission were discussed.       AS OF 14:03 EDT VITALS:    BP - 149/93  HR - 94  TEMP - 97.7 °F (36.5 °C)  O2 SATS - 95%                  DIAGNOSIS  Final diagnoses:   Migraine without status migrainosus, not intractable, unspecified migraine type         DISPOSITION  Discharge      Please note that portions of this document were completed with voice recognition software.      Ariel Ward PA-C  04/01/25 1234

## 2025-04-07 ENCOUNTER — HOSPITAL ENCOUNTER (OUTPATIENT)
Dept: GENERAL RADIOLOGY | Facility: HOSPITAL | Age: 25
Discharge: HOME | End: 2025-04-07
Admitting: NURSE PRACTITIONER
Payer: MEDICAID

## 2025-04-07 ENCOUNTER — TRANSCRIBE ORDERS (OUTPATIENT)
Dept: GENERAL RADIOLOGY | Facility: HOSPITAL | Age: 25
End: 2025-04-07
Payer: MEDICAID

## 2025-04-07 DIAGNOSIS — M25.561 RIGHT KNEE PAIN, UNSPECIFIED CHRONICITY: Primary | ICD-10-CM

## 2025-04-07 DIAGNOSIS — M25.561 RIGHT KNEE PAIN, UNSPECIFIED CHRONICITY: ICD-10-CM

## 2025-04-07 PROCEDURE — 73562 X-RAY EXAM OF KNEE 3: CPT

## 2025-04-15 ENCOUNTER — HOSPITAL ENCOUNTER (EMERGENCY)
Facility: HOSPITAL | Age: 25
Discharge: HOME | End: 2025-04-15
Attending: EMERGENCY MEDICINE | Admitting: EMERGENCY MEDICINE
Payer: MEDICAID

## 2025-04-15 ENCOUNTER — APPOINTMENT (OUTPATIENT)
Facility: HOSPITAL | Age: 25
End: 2025-04-15
Payer: MEDICAID

## 2025-04-15 VITALS
HEART RATE: 110 BPM | BODY MASS INDEX: 43.4 KG/M2 | HEIGHT: 69 IN | SYSTOLIC BLOOD PRESSURE: 152 MMHG | OXYGEN SATURATION: 96 % | DIASTOLIC BLOOD PRESSURE: 82 MMHG | WEIGHT: 293 LBS | RESPIRATION RATE: 15 BRPM | TEMPERATURE: 98.2 F

## 2025-04-15 DIAGNOSIS — M25.561 ACUTE PAIN OF RIGHT KNEE: Primary | ICD-10-CM

## 2025-04-15 PROCEDURE — 73560 X-RAY EXAM OF KNEE 1 OR 2: CPT

## 2025-04-15 PROCEDURE — 99283 EMERGENCY DEPT VISIT LOW MDM: CPT | Performed by: EMERGENCY MEDICINE

## 2025-04-15 RX ORDER — IBUPROFEN 200 MG
600 TABLET ORAL ONCE
Status: COMPLETED | OUTPATIENT
Start: 2025-04-15 | End: 2025-04-15

## 2025-04-15 RX ADMIN — IBUPROFEN 600 MG: 200 TABLET, FILM COATED ORAL at 21:42

## 2025-04-15 NOTE — Clinical Note
[facility] EMERGENCY DEPARTMENT [place]  [address]  [facility] [address]  Phone: [phone]  Fax: [phone]    Kylah Ruiz was seen and treated in our emergency department on 4/15/2025.  She may return to work on 04/17/2025.         Thank you for choosing [facility].    Charlie Menjivar PA-C

## 2025-04-16 NOTE — DISCHARGE INSTRUCTIONS
You were seen in the emergency department for right knee pain.  Your imaging was negative for any acute fracture or dislocation is advised that you continue to follow-up with orthopedics for your prescheduled appointment next Monday as well as physical therapy on Thursday.  Please continue to take Tylenol and ibuprofen as needed for pain as well as ice.  Return to the emergency department on symptoms including but not limited to worsening pain, red hot swollen joint or fevers.

## 2025-04-16 NOTE — [facility] PROVIDER NOTE
Subjective  History of Present Illness:    Presented to the emergency room with 2 weeks of right knee pain.  Patient states that 2 weeks ago she was jumping on a trampoline when she felt a pop in her right knee.  Since then patient states that she experience on and off pain.  States that it is worse when she is going up the stairs or which she flexes the knee.  Patient also states that she has tripped multiple times over the last 2 weeks and states that she is worried that she has worsened her knee problems.  Patient states that she has a appointment with orthopedics for further evaluation scheduled next week but due to the continued pain she came into the emergency department today.  Patient denies feeling of instability in knee, fevers, swelling of knee, redness, numbness or tingling.  On physical exam patient's knee does not appear swollen, erythematous, bruised or warm to touch.  Pain elicited on flexion of knee.  Lachman's test negative, Bridger's test positive on lateral meniscus.    Nurses Notes reviewed and agree, including vitals, allergies, social history and prior medical history.     REVIEW OF SYSTEMS: All systems reviewed and not pertinent unless noted.  Review of Systems   Musculoskeletal:         Right knee pain   All other systems reviewed and are negative.      Past Medical History:   Diagnosis Date    Asthma     Bipolar 1 disorder     Depression        Allergies:    Patient has no known allergies.      Past Surgical History:   Procedure Laterality Date    CYST REMOVAL Left     ovary    LEFT OOPHORECTOMY Left 09/2024    MOUTH SURGERY      RIGHT OOPHORECTOMY      TYMPANOSTOMY TUBE PLACEMENT           Social History     Socioeconomic History    Marital status: Single   Tobacco Use    Smoking status: Never    Smokeless tobacco: Never   Vaping Use    Vaping status: Every Day    Substances: Nicotine, Flavoring    Devices: Refillable tank   Substance and Sexual Activity    Alcohol use: No    Drug use: No  "   Sexual activity: Never     Birth control/protection: None         Family History   Problem Relation Age of Onset    Migraines Mother     Mental illness Mother     Coronary artery disease Father     Migraines Sister     Bipolar disorder Brother     Hypertension Maternal Grandmother     Diabetes Maternal Grandmother     Coronary artery disease Maternal Grandmother     Cancer Maternal Grandfather     Lung cancer Maternal Grandfather     Hypertension Maternal Grandfather     Cancer Paternal Grandmother     Bone cancer Paternal Grandmother     Colon cancer Paternal Grandmother     Coronary artery disease Paternal Grandfather     Clotting disorder Paternal Grandfather     Coronary artery disease Other     Breast cancer Other     Bipolar disorder Half-Sister        Objective  Physical Exam:  /82 (BP Location: Left arm, Patient Position: Sitting)   Pulse 110   Temp 98.2 °F (36.8 °C) (Oral)   Resp 15   Ht 175.3 cm (69\")   Wt (!) 150 kg (330 lb)   SpO2 96%   BMI 48.73 kg/m²      Physical Exam  Constitutional:       General: She is not in acute distress.     Appearance: Normal appearance. She is obese. She is not toxic-appearing.   HENT:      Head: Normocephalic and atraumatic.   Cardiovascular:      Rate and Rhythm: Tachycardia present.      Heart sounds: Normal heart sounds.   Pulmonary:      Effort: Pulmonary effort is normal. No respiratory distress.      Breath sounds: Normal breath sounds.   Musculoskeletal:         General: Tenderness (Tenderness medial to patella) present. No swelling or deformity. Normal range of motion.   Skin:     General: Skin is warm and dry.      Findings: No erythema.   Neurological:      General: No focal deficit present.      Mental Status: She is alert and oriented to person, place, and time.   Psychiatric:         Mood and Affect: Mood normal.         Behavior: Behavior normal.         Procedures    ED Course:         Lab Results (last 24 hours)       ** No results found for " the last 24 hours. **             XR Knee 1 or 2 View Right  Result Date: 4/15/2025  XR KNEE 1 OR 2 VW RIGHT Date of Exam: 4/15/2025 9:13 PM EDT Indication: knee pain with trauma patient felt a pop a couple of weeks ago. Continued knee pain. Comparison: 4/7/2025, 11/22/2023 FINDINGS: There is no displaced fracture or dislocation. There is no joint effusion. No focal osseous abnormalities are identified. The adjacent soft tissues are unremarkable.     Impression: 1.No evidence for displaced fracture or dislocation. Given the prolonged symptoms, if there is concern for internal derangement, correlation with follow-up MRI of the right knee to assess for underlying ligament/tendon injury may be considered as clinically indicated. Electronically Signed: Luke Mcgee MD  4/15/2025 9:24 PM EDT  Workstation ID: [id number]         MDM     Amount and/or Complexity of Data Reviewed  Tests in the radiology section of CPT®: reviewed        Initial impression of presenting illness: Obese female in no acute distress upon exam    DDX: includes but is not limited to: Right knee dislocation, fracture, ligament injury      Pertinent features from physical exam: Pain elicited on flexion of right knee, Lachman's negative, Bridger's test negative on lateral meniscus.  Right knee is not swollen, erythematous or warm to touch.    Initial diagnostic plan: X-ray of right knee.    Results from initial plan were reviewed and interpreted by me revealing x-ray of right knee showed no acute fracture or dislocation of the knee.      Interventions: Medications administered as below    Medications   ibuprofen (ADVIL,MOTRIN) tablet 600 mg (600 mg Oral Given 4/15/25 2142)       Reevaluation: Upon reevaluation patient reports she reports mild improvement in pain with Motrin.          Counseling: Discussed the results above with the patient regarding need for continued follow-up with her prescheduled orthopedics appointment next Monday.  Discussed  with patient that although fracture and dislocation was ruled out she may still have a ligament injury and to continue to follow-up with orthopedics for further evaluation.  Patient was offered prescription pain medication to take home but states that she would just take ibuprofen and Tylenol as needed.  Discussed with patient signs and symptoms that warrant follow-up in the emergency department patient understands and agrees plan of care.      -----  ED Disposition       ED Disposition   Discharge    Condition   Stable    Comment   --             Final diagnoses:   Acute pain of right knee      Your Follow-Up Providers       Belem West APRN. Schedule an appointment as soon as possible for a visit in 1 week.    Specialty: Nurse Practitioner  Follow up details: Reevaluation of todays symptoms  [address] [place] [id number] [phone]               Orthopedics On 4/21/2025.    Follow up details: Reevaluation of todays symptoms  Previously scheduled appointment                     Contact information for after-discharge care    Follow-up information has not been specified.                    Your medication list        CONTINUE taking these medications        Instructions Last Dose Given Next Dose Due   BD ULTRA-FINE PEN NEEDLES 29G X 12.7MM misc  Generic drug: Insulin Pen Needle           clotrimazole-betamethasone 1-0.05 % cream  Commonly known as: LOTRISONE      Apply 1 Application topically to the appropriate area as directed 2 (Two) Times a Day.       desvenlafaxine 50 MG 24 hr tablet  Commonly known as: PRISTIQ      Take 1 tablet by mouth Daily.       estradiol 0.5 MG tablet  Commonly known as: ESTRACE      Take 1 tablet by mouth Daily.       HYDROcodone-acetaminophen 5-325 MG per tablet  Commonly known as: NORCO      Take 1 tablet by mouth Every 6 (Six) Hours As Needed for Severe Pain.       Iron (Ferrous Sulfate) 325 (65 Fe) MG tablet      Take 325 mg by mouth Daily.       ketorolac 10 MG  tablet  Commonly known as: TORADOL      Take 1 tablet by mouth Every 6 (Six) Hours As Needed for Moderate Pain.       lamoTRIgine 25 MG tablet  Commonly known as: LaMICtal      Take 3 tablets by mouth Daily.       ondansetron ODT 4 MG disintegrating tablet  Commonly known as: ZOFRAN-ODT      Place 1 tablet under the tongue Every 6 (Six) Hours As Needed for Vomiting or Nausea.       Victoza 18 MG/3ML solution pen-injector injection  Generic drug: Liraglutide      Inject 1.2 mg under the skin into the appropriate area as directed Daily.       vitamin B-12 1000 MCG tablet  Commonly known as: CYANOCOBALAMIN      Take 1 tablet by mouth Daily.       vitamin D 1.25 MG (91712 UT) capsule capsule  Commonly known as: ERGOCALCIFEROL      Take 1 capsule by mouth Every 7 (Seven) Days.

## 2025-08-06 ENCOUNTER — TRANSCRIBE ORDERS (OUTPATIENT)
Dept: ADMINISTRATIVE | Facility: HOSPITAL | Age: 25
End: 2025-08-06
Payer: MEDICAID

## 2025-08-06 DIAGNOSIS — R10.9 ABDOMINAL PAIN, UNSPECIFIED ABDOMINAL LOCATION: Primary | ICD-10-CM

## 2025-08-06 DIAGNOSIS — K66.0 POSTOPERATIVE ADHESIONS: ICD-10-CM

## 2025-08-08 ENCOUNTER — TRANSCRIBE ORDERS (OUTPATIENT)
Dept: ULTRASOUND IMAGING | Facility: HOSPITAL | Age: 25
End: 2025-08-08
Payer: MEDICAID

## 2025-08-08 DIAGNOSIS — R10.9 STOMACH ACHE: Primary | ICD-10-CM

## 2025-08-08 DIAGNOSIS — K66.0 ABDOMINAL ADHESIONS: ICD-10-CM

## 2025-08-12 ENCOUNTER — TRANSCRIBE ORDERS (OUTPATIENT)
Dept: LAB | Facility: HOSPITAL | Age: 25
End: 2025-08-12
Payer: MEDICAID

## 2025-08-12 ENCOUNTER — LAB (OUTPATIENT)
Dept: LAB | Facility: HOSPITAL | Age: 25
End: 2025-08-12
Payer: MEDICAID

## 2025-08-12 DIAGNOSIS — Z00.00 GENERAL MEDICAL EXAM: Primary | ICD-10-CM

## 2025-08-12 DIAGNOSIS — Z00.00 GENERAL MEDICAL EXAM: ICD-10-CM

## 2025-08-12 LAB
ALBUMIN SERPL-MCNC: 3.9 G/DL (ref 3.5–5.2)
ALBUMIN/GLOB SERPL: 1.3 G/DL
ALP SERPL-CCNC: 91 U/L (ref 39–117)
ALT SERPL W P-5'-P-CCNC: 11 U/L (ref 1–33)
ANION GAP SERPL CALCULATED.3IONS-SCNC: 11.8 MMOL/L (ref 5–15)
AST SERPL-CCNC: 15 U/L (ref 1–32)
BASOPHILS # BLD AUTO: 0.04 10*3/MM3 (ref 0–0.2)
BASOPHILS NFR BLD AUTO: 0.5 % (ref 0–1.5)
BILIRUB SERPL-MCNC: <0.2 MG/DL (ref 0–1.2)
BUN SERPL-MCNC: 9 MG/DL (ref 6–20)
BUN/CREAT SERPL: 10.1 (ref 7–25)
CALCIUM SPEC-SCNC: 9.1 MG/DL (ref 8.6–10.5)
CHLORIDE SERPL-SCNC: 103 MMOL/L (ref 98–107)
CHOLEST SERPL-MCNC: 202 MG/DL (ref 0–200)
CO2 SERPL-SCNC: 21.2 MMOL/L (ref 22–29)
CREAT SERPL-MCNC: 0.89 MG/DL (ref 0.57–1)
CRP SERPL-MCNC: 0.52 MG/DL (ref 0–0.5)
DEPRECATED RDW RBC AUTO: 38.6 FL (ref 37–54)
EGFRCR SERPLBLD CKD-EPI 2021: 93 ML/MIN/1.73
EOSINOPHIL # BLD AUTO: 0.15 10*3/MM3 (ref 0–0.4)
EOSINOPHIL NFR BLD AUTO: 2 % (ref 0.3–6.2)
ERYTHROCYTE [DISTWIDTH] IN BLOOD BY AUTOMATED COUNT: 13 % (ref 12.3–15.4)
ERYTHROCYTE [SEDIMENTATION RATE] IN BLOOD: 14 MM/HR (ref 0–20)
FERRITIN SERPL-MCNC: 82.1 NG/ML (ref 13–150)
GLOBULIN UR ELPH-MCNC: 2.9 GM/DL
GLUCOSE SERPL-MCNC: 109 MG/DL (ref 65–99)
HBA1C MFR BLD: 5.5 % (ref 4.8–5.6)
HCT VFR BLD AUTO: 38.1 % (ref 34–46.6)
HDLC SERPL-MCNC: 38 MG/DL (ref 40–60)
HGB BLD-MCNC: 12.7 G/DL (ref 12–15.9)
IGA1 MFR SER: 212 MG/DL (ref 70–400)
IMM GRANULOCYTES # BLD AUTO: 0.04 10*3/MM3 (ref 0–0.05)
IMM GRANULOCYTES NFR BLD AUTO: 0.5 % (ref 0–0.5)
LDLC SERPL CALC-MCNC: 138 MG/DL (ref 0–100)
LDLC/HDLC SERPL: 3.56 {RATIO}
LYMPHOCYTES # BLD AUTO: 2.7 10*3/MM3 (ref 0.7–3.1)
LYMPHOCYTES NFR BLD AUTO: 35.1 % (ref 19.6–45.3)
MCH RBC QN AUTO: 27.8 PG (ref 26.6–33)
MCHC RBC AUTO-ENTMCNC: 33.3 G/DL (ref 31.5–35.7)
MCV RBC AUTO: 83.4 FL (ref 79–97)
MONOCYTES # BLD AUTO: 0.29 10*3/MM3 (ref 0.1–0.9)
MONOCYTES NFR BLD AUTO: 3.8 % (ref 5–12)
NEUTROPHILS NFR BLD AUTO: 4.47 10*3/MM3 (ref 1.7–7)
NEUTROPHILS NFR BLD AUTO: 58.1 % (ref 42.7–76)
NRBC BLD AUTO-RTO: 0 /100 WBC (ref 0–0.2)
PLATELET # BLD AUTO: 310 10*3/MM3 (ref 140–450)
PMV BLD AUTO: 10.6 FL (ref 6–12)
POTASSIUM SERPL-SCNC: 3.8 MMOL/L (ref 3.5–5.2)
PROT SERPL-MCNC: 6.8 G/DL (ref 6–8.5)
RBC # BLD AUTO: 4.57 10*6/MM3 (ref 3.77–5.28)
SODIUM SERPL-SCNC: 136 MMOL/L (ref 136–145)
T3 SERPL-MCNC: 212 NG/DL (ref 80–200)
T3FREE SERPL-MCNC: 3.7 PG/ML (ref 2–4.4)
T4 FREE SERPL-MCNC: 1.02 NG/DL (ref 0.92–1.68)
T4 SERPL-MCNC: 9.42 MCG/DL (ref 4.5–11.7)
TRIGL SERPL-MCNC: 143 MG/DL (ref 0–150)
TSH SERPL DL<=0.05 MIU/L-ACNC: 4.66 UIU/ML (ref 0.27–4.2)
VIT B12 BLD-MCNC: 339 PG/ML (ref 211–946)
VLDLC SERPL-MCNC: 26 MG/DL (ref 5–40)
WBC NRBC COR # BLD AUTO: 7.69 10*3/MM3 (ref 3.4–10.8)

## 2025-08-12 PROCEDURE — 86162 COMPLEMENT TOTAL (CH50): CPT

## 2025-08-12 PROCEDURE — 86140 C-REACTIVE PROTEIN: CPT

## 2025-08-12 PROCEDURE — 86940 HEMOLYSINS/AGGLUTININS AUTO: CPT

## 2025-08-12 PROCEDURE — 85652 RBC SED RATE AUTOMATED: CPT

## 2025-08-12 PROCEDURE — 80053 COMPREHEN METABOLIC PANEL: CPT

## 2025-08-12 PROCEDURE — 80061 LIPID PANEL: CPT

## 2025-08-12 PROCEDURE — 84439 ASSAY OF FREE THYROXINE: CPT

## 2025-08-12 PROCEDURE — 84466 ASSAY OF TRANSFERRIN: CPT

## 2025-08-12 PROCEDURE — 86431 RHEUMATOID FACTOR QUANT: CPT

## 2025-08-12 PROCEDURE — 82784 ASSAY IGA/IGD/IGG/IGM EACH: CPT

## 2025-08-12 PROCEDURE — 82785 ASSAY OF IGE: CPT

## 2025-08-12 PROCEDURE — 82672 ASSAY OF ESTROGEN: CPT

## 2025-08-12 PROCEDURE — 84481 FREE ASSAY (FT-3): CPT

## 2025-08-12 PROCEDURE — 86800 THYROGLOBULIN ANTIBODY: CPT

## 2025-08-12 PROCEDURE — 86376 MICROSOMAL ANTIBODY EACH: CPT

## 2025-08-12 PROCEDURE — 82607 VITAMIN B-12: CPT

## 2025-08-12 PROCEDURE — 36415 COLL VENOUS BLD VENIPUNCTURE: CPT

## 2025-08-12 PROCEDURE — 85025 COMPLETE CBC W/AUTO DIFF WBC: CPT

## 2025-08-12 PROCEDURE — 82728 ASSAY OF FERRITIN: CPT

## 2025-08-12 PROCEDURE — 83540 ASSAY OF IRON: CPT

## 2025-08-12 PROCEDURE — 86038 ANTINUCLEAR ANTIBODIES: CPT

## 2025-08-12 PROCEDURE — 84443 ASSAY THYROID STIM HORMONE: CPT

## 2025-08-12 PROCEDURE — 83036 HEMOGLOBIN GLYCOSYLATED A1C: CPT

## 2025-08-13 LAB
CHROMATIN AB SERPL-ACNC: <10 IU/ML (ref 0–14)
IGG1 SER-MCNC: 1065 MG/DL (ref 700–1600)
IGM SERPL-MCNC: 67 MG/DL (ref 40–230)
IRON 24H UR-MRATE: 70 MCG/DL (ref 37–145)
IRON SATN MFR SERPL: 19 % (ref 20–50)
TIBC SERPL-MCNC: 373 MCG/DL (ref 298–536)
TRANSFERRIN SERPL-MCNC: 250 MG/DL (ref 200–360)

## 2025-08-14 LAB
ANA SER QL: NEGATIVE
CH50 SERPL-ACNC: >60 U/ML
THYROGLOB AB SERPL-ACNC: 2.7 IU/ML (ref 0–0.9)
THYROPEROXIDASE AB SERPL-ACNC: 235 IU/ML (ref 0–34)

## 2025-08-16 LAB — ESTROGEN SERPL-MCNC: 1391 PG/ML

## 2025-08-17 LAB
A AB TITR SERPL: NORMAL {TITER}
ABO GROUP BLD: NORMAL
B AB TITR SERPL: NORMAL {TITER}
BLD GP AB SCN SERPL DONR QL: NEGATIVE

## 2025-08-18 LAB — IGE SERPL-ACNC: 34 IU/ML (ref 6–495)
